# Patient Record
Sex: MALE | Race: WHITE | Employment: FULL TIME | URBAN - METROPOLITAN AREA
[De-identification: names, ages, dates, MRNs, and addresses within clinical notes are randomized per-mention and may not be internally consistent; named-entity substitution may affect disease eponyms.]

---

## 2017-12-12 ENCOUNTER — PATIENT MESSAGE (OUTPATIENT)
Dept: FAMILY MEDICINE CLINIC | Age: 52
End: 2017-12-12

## 2017-12-12 DIAGNOSIS — F41.9 ANXIETY: ICD-10-CM

## 2017-12-12 DIAGNOSIS — R10.30 LOWER ABDOMINAL PAIN: ICD-10-CM

## 2017-12-18 NOTE — TELEPHONE ENCOUNTER
Staff to schedule with me and order enough meds to get him to that appointment and then notify the patient

## 2017-12-19 RX ORDER — OMEPRAZOLE 20 MG/1
20 CAPSULE, DELAYED RELEASE ORAL DAILY
Qty: 90 CAPSULE | Refills: 0 | Status: SHIPPED | OUTPATIENT
Start: 2017-12-19 | End: 2018-01-08 | Stop reason: ALTCHOICE

## 2017-12-19 RX ORDER — ALBUTEROL SULFATE 90 UG/1
2 AEROSOL, METERED RESPIRATORY (INHALATION) EVERY 6 HOURS PRN
Qty: 3 INHALER | Refills: 0 | Status: SHIPPED | OUTPATIENT
Start: 2017-12-19

## 2017-12-19 RX ORDER — FLUVOXAMINE MALEATE 50 MG/1
50 TABLET, COATED ORAL NIGHTLY
Qty: 90 TABLET | Refills: 0 | Status: SHIPPED | OUTPATIENT
Start: 2017-12-19 | End: 2018-01-08 | Stop reason: SDUPTHER

## 2018-01-08 ENCOUNTER — OFFICE VISIT (OUTPATIENT)
Dept: FAMILY MEDICINE CLINIC | Age: 53
End: 2018-01-08

## 2018-01-08 VITALS
HEART RATE: 87 BPM | WEIGHT: 294.8 LBS | OXYGEN SATURATION: 95 % | SYSTOLIC BLOOD PRESSURE: 130 MMHG | HEIGHT: 75 IN | BODY MASS INDEX: 36.65 KG/M2 | DIASTOLIC BLOOD PRESSURE: 80 MMHG | RESPIRATION RATE: 18 BRPM

## 2018-01-08 DIAGNOSIS — K21.9 GASTROESOPHAGEAL REFLUX DISEASE WITHOUT ESOPHAGITIS: ICD-10-CM

## 2018-01-08 DIAGNOSIS — D86.0 SARCOIDOSIS OF LUNG (HCC): ICD-10-CM

## 2018-01-08 DIAGNOSIS — F41.9 ANXIETY: Primary | ICD-10-CM

## 2018-01-08 DIAGNOSIS — M77.12 LEFT LATERAL EPICONDYLITIS: ICD-10-CM

## 2018-01-08 DIAGNOSIS — Z12.11 SCREEN FOR COLON CANCER: ICD-10-CM

## 2018-01-08 PROCEDURE — 99214 OFFICE O/P EST MOD 30 MIN: CPT | Performed by: FAMILY MEDICINE

## 2018-01-08 RX ORDER — PANTOPRAZOLE SODIUM 40 MG/1
40 TABLET, DELAYED RELEASE ORAL DAILY
Qty: 90 TABLET | Refills: 3 | Status: SHIPPED | OUTPATIENT
Start: 2018-01-08 | End: 2019-09-03 | Stop reason: SDUPTHER

## 2018-01-08 RX ORDER — FLUVOXAMINE MALEATE 50 MG/1
50 TABLET, COATED ORAL NIGHTLY
Qty: 90 TABLET | Refills: 3 | Status: SHIPPED | OUTPATIENT
Start: 2018-01-08 | End: 2018-01-19 | Stop reason: SDUPTHER

## 2018-01-08 ASSESSMENT — PATIENT HEALTH QUESTIONNAIRE - PHQ9
2. FEELING DOWN, DEPRESSED OR HOPELESS: 0
1. LITTLE INTEREST OR PLEASURE IN DOING THINGS: 0
SUM OF ALL RESPONSES TO PHQ QUESTIONS 1-9: 0
SUM OF ALL RESPONSES TO PHQ9 QUESTIONS 1 & 2: 0

## 2018-01-08 NOTE — PATIENT INSTRUCTIONS
forward with your legs slightly spread and your left hand on your left thigh. 2. Place your right elbow on your right thigh, and hold the weight with your forearm horizontal.  3. Slowly curl the weight up and toward your chest.  4. Repeat this motion 8 to 12 times. 5. Switch arms, and do steps 1 through 4. Follow-up care is a key part of your treatment and safety. Be sure to make and go to all appointments, and call your doctor if you are having problems. It's also a good idea to know your test results and keep a list of the medicines you take. Where can you learn more? Go to [email or web address] and sign in to your [employer] account. Enter X604 in the [employer] box to learn more about \"Tennis Elbow: Exercises. \"     If you do not have an account, please click on the \"Sign Up Now\" link. Current as of: March 21, 2017  Content Version: 11.5  © 1105-6047 Healthwise, Incorporated. Care instructions adapted under license by [facility] ([facility]). If you have questions about a medical condition or this instruction, always ask your healthcare professional. [place] [address] any warranty or liability for your use of this information.

## 2018-01-10 NOTE — PROGRESS NOTES
abdominal pain or discomfort. The patient has noted no bleeding associated with bowel movements. The patient does not have a family history of colon polyps. The patient does not have a family history of colon cancer. The patient does not have a family history of Crohn's or Ulcerative Colitis. ALso reports some ongoing left elbow pain and pain with lifting. No current treatments. Improved with rest. Worse with more activity. Lab Results   Component Value Date    WBC 8.0 02/29/2016    HGB 16.8 02/29/2016    HCT 49.4 02/29/2016    MCV 84.6 02/29/2016     02/29/2016       Chemistry        Component Value Date/Time     02/29/2016 0721    K 4.2 02/29/2016 0721     02/29/2016 0721    CO2 25 02/29/2016 0721    BUN 15 02/29/2016 0721    CREATININE 1.1 02/29/2016 0721        Component Value Date/Time    CALCIUM 9.1 02/29/2016 0721    ALKPHOS 86 02/29/2016 0721    AST 30 02/29/2016 0721    ALT 29 02/29/2016 0721    BILITOT 0.7 02/29/2016 0721        PHQ Scores 1/8/2018 2/2/2016   PHQ2 Score 0 3   PHQ9 Score 0 14     Interpretation of Total Score Depression Severity: 1-4 = Minimal depression, 5-9 = Mild depression, 10-14 = Moderate depression, 15-19 = Moderately severe depression, 20-27 = Severe depression        Physical Exam   Nursing note reviewed  /80 (Site: Left Arm, Position: Sitting, Cuff Size: Large Adult)   Pulse 87   Resp 18   Ht 6' 3\" (1.905 m)   Wt 294 lb 12.8 oz (133.7 kg)   SpO2 95%   BMI 36.85 kg/m²   BP Readings from Last 3 Encounters:   01/08/18 130/80   11/10/17 136/85   08/09/17 130/82     Body mass index is 36.85 kg/m². Constitutional: He is oriented to person, place, and time. He appears well-developed and well-nourished. No distress. Neck: Trachea normal. Neck supple. Cardiovascular: Normal rate, regular rhythm, S1 normal, S2 normal and normal heart sounds. No murmur heard. Pulmonary/Chest: Breath sounds normal. No accessory muscle usage.  No respiratory

## 2018-01-19 DIAGNOSIS — F41.9 ANXIETY: ICD-10-CM

## 2018-01-19 RX ORDER — FLUVOXAMINE MALEATE 50 MG/1
50 TABLET, COATED ORAL NIGHTLY
Qty: 90 TABLET | Refills: 3 | Status: SHIPPED | OUTPATIENT
Start: 2018-01-19 | End: 2019-09-03 | Stop reason: ALTCHOICE

## 2018-07-02 ENCOUNTER — HOSPITAL ENCOUNTER (OUTPATIENT)
Dept: GENERAL RADIOLOGY | Age: 53
Discharge: HOME | End: 2018-07-02
Attending: INTERNAL MEDICINE | Admitting: INTERNAL MEDICINE

## 2018-07-02 DIAGNOSIS — J43.9 PULMONARY EMPHYSEMA, UNSPECIFIED EMPHYSEMA TYPE (HCC): ICD-10-CM

## 2018-07-02 DIAGNOSIS — D86.9 SARCOIDOSIS: ICD-10-CM

## 2018-07-23 ENCOUNTER — OFFICE VISIT (OUTPATIENT)
Dept: FAMILY MEDICINE CLINIC | Age: 53
End: 2018-07-23

## 2018-07-23 VITALS
SYSTOLIC BLOOD PRESSURE: 128 MMHG | HEART RATE: 58 BPM | HEIGHT: 76 IN | OXYGEN SATURATION: 98 % | BODY MASS INDEX: 38.36 KG/M2 | WEIGHT: 315 LBS | DIASTOLIC BLOOD PRESSURE: 80 MMHG

## 2018-07-23 DIAGNOSIS — Z13.6 ENCOUNTER FOR LIPID SCREENING FOR CARDIOVASCULAR DISEASE: ICD-10-CM

## 2018-07-23 DIAGNOSIS — D86.0 SARCOIDOSIS OF LUNG (HCC): ICD-10-CM

## 2018-07-23 DIAGNOSIS — J02.9 ACUTE PHARYNGITIS, UNSPECIFIED ETIOLOGY: Primary | ICD-10-CM

## 2018-07-23 DIAGNOSIS — Z13.220 ENCOUNTER FOR LIPID SCREENING FOR CARDIOVASCULAR DISEASE: ICD-10-CM

## 2018-07-23 LAB — STREPTOCOCCUS A RNA: NORMAL

## 2018-07-23 PROCEDURE — G8427 DOCREV CUR MEDS BY ELIG CLIN: HCPCS | Performed by: FAMILY MEDICINE

## 2018-07-23 PROCEDURE — 87651 STREP A DNA AMP PROBE: CPT | Performed by: FAMILY MEDICINE

## 2018-07-23 PROCEDURE — 3017F COLORECTAL CA SCREEN DOC REV: CPT | Performed by: FAMILY MEDICINE

## 2018-07-23 PROCEDURE — 1036F TOBACCO NON-USER: CPT | Performed by: FAMILY MEDICINE

## 2018-07-23 PROCEDURE — G8417 CALC BMI ABV UP PARAM F/U: HCPCS | Performed by: FAMILY MEDICINE

## 2018-07-23 PROCEDURE — 99213 OFFICE O/P EST LOW 20 MIN: CPT | Performed by: FAMILY MEDICINE

## 2018-07-23 NOTE — PROGRESS NOTES
Orders   1. Acute pharyngitis, unspecified etiology  POCT Rapid Strep A DNA (Alere i)   2. Sarcoidosis of lung (HCC)  TSH without Reflex    Comprehensive Metabolic Panel   3. Encounter for lipid screening for cardiovascular disease  Lipid Panel       Plan:   Possible post nasal drip. Add daily antihistamine and muscinex for next 2 weeks. Patient also due for lab work for chronic disease. Also discussed symptomatic therapy suggested: push fluids, rest and use vaporizer or mist prn. Patient will call or return to clinic prn if these symptoms worsen or fail to improve as anticipated. Work/ school status:   Restrictions: full duty  today    Return in about 6 months (around 1/23/2019) for GERD, moods or sooner based on lab results .

## 2018-08-08 ENCOUNTER — PATIENT MESSAGE (OUTPATIENT)
Dept: FAMILY MEDICINE CLINIC | Age: 53
End: 2018-08-08

## 2018-08-10 ENCOUNTER — HOSPITAL ENCOUNTER (OUTPATIENT)
Dept: GENERAL RADIOLOGY | Age: 53
Discharge: HOME | End: 2018-08-10
Attending: FAMILY MEDICINE | Admitting: FAMILY MEDICINE

## 2018-08-10 LAB
ALBUMIN SERPL-MCNC: 4.3 GM/DL (ref 3.4–5)
ALP BLD-CCNC: 84 IU/L (ref 40–128)
ALT SERPL-CCNC: 33 U/L (ref 10–40)
ANION GAP SERPL CALCULATED.3IONS-SCNC: 14 MMOL/L (ref 4–16)
AST SERPL-CCNC: 39 IU/L (ref 15–37)
BILIRUB SERPL-MCNC: 0.6 MG/DL (ref 0–1)
BUN BLDV-MCNC: 19 MG/DL (ref 6–23)
CALCIUM SERPL-MCNC: 9.3 MG/DL (ref 8.3–10.6)
CHLORIDE BLD-SCNC: 102 MMOL/L (ref 99–110)
CHOLESTEROL: 173 MG/DL
CO2: 25 MMOL/L (ref 21–32)
CREAT SERPL-MCNC: 1.1 MG/DL (ref 0.9–1.3)
GFR AFRICAN AMERICAN: >60 ML/MIN/1.73M2
GFR NON-AFRICAN AMERICAN: >60 ML/MIN/1.73M2
GLUCOSE BLD-MCNC: 88 MG/DL (ref 70–99)
HDLC SERPL-MCNC: 38 MG/DL
LDL CHOLESTEROL DIRECT: 125 MG/DL
POTASSIUM SERPL-SCNC: 4.1 MMOL/L (ref 3.5–5.1)
SODIUM BLD-SCNC: 141 MMOL/L (ref 135–145)
TOTAL PROTEIN: 6.5 GM/DL (ref 6.4–8.2)
TRIGL SERPL-MCNC: 151 MG/DL
TSH HIGH SENSITIVITY: 1.84 UIU/ML (ref 0.27–4.2)

## 2019-01-08 ENCOUNTER — PATIENT MESSAGE (OUTPATIENT)
Dept: FAMILY MEDICINE CLINIC | Age: 54
End: 2019-01-08

## 2019-01-08 DIAGNOSIS — G47.33 OSA ON CPAP: ICD-10-CM

## 2019-01-08 DIAGNOSIS — Z99.89 OSA ON CPAP: ICD-10-CM

## 2019-01-08 DIAGNOSIS — E66.01 CLASS 2 SEVERE OBESITY WITH SERIOUS COMORBIDITY AND BODY MASS INDEX (BMI) OF 39.0 TO 39.9 IN ADULT, UNSPECIFIED OBESITY TYPE (HCC): Primary | ICD-10-CM

## 2019-01-08 DIAGNOSIS — F42.2 MIXED OBSESSIONAL THOUGHTS AND ACTS: ICD-10-CM

## 2019-01-09 ENCOUNTER — TELEPHONE (OUTPATIENT)
Dept: BARIATRICS/WEIGHT MGMT | Age: 54
End: 2019-01-09

## 2019-03-07 ENCOUNTER — NURSE ONLY (OUTPATIENT)
Dept: FAMILY MEDICINE CLINIC | Age: 54
End: 2019-03-07
Payer: COMMERCIAL

## 2019-03-07 DIAGNOSIS — Z23 NEED FOR VACCINATION: Primary | ICD-10-CM

## 2019-03-07 PROCEDURE — 90471 IMMUNIZATION ADMIN: CPT | Performed by: FAMILY MEDICINE

## 2019-03-07 PROCEDURE — 90472 IMMUNIZATION ADMIN EACH ADD: CPT | Performed by: FAMILY MEDICINE

## 2019-03-07 PROCEDURE — 90732 PPSV23 VACC 2 YRS+ SUBQ/IM: CPT | Performed by: FAMILY MEDICINE

## 2019-03-07 PROCEDURE — 90632 HEPA VACCINE ADULT IM: CPT | Performed by: FAMILY MEDICINE

## 2019-09-03 ENCOUNTER — OFFICE VISIT (OUTPATIENT)
Dept: FAMILY MEDICINE CLINIC | Age: 54
End: 2019-09-03
Payer: COMMERCIAL

## 2019-09-03 VITALS
HEART RATE: 86 BPM | WEIGHT: 299.6 LBS | BODY MASS INDEX: 36.47 KG/M2 | SYSTOLIC BLOOD PRESSURE: 114 MMHG | DIASTOLIC BLOOD PRESSURE: 74 MMHG | TEMPERATURE: 97 F | OXYGEN SATURATION: 96 %

## 2019-09-03 DIAGNOSIS — R73.01 ABNORMAL FASTING GLUCOSE: ICD-10-CM

## 2019-09-03 DIAGNOSIS — K21.9 GASTROESOPHAGEAL REFLUX DISEASE WITHOUT ESOPHAGITIS: ICD-10-CM

## 2019-09-03 DIAGNOSIS — Z23 NEED FOR HEPATITIS A IMMUNIZATION: ICD-10-CM

## 2019-09-03 DIAGNOSIS — Z23 NEED FOR INFLUENZA VACCINATION: ICD-10-CM

## 2019-09-03 DIAGNOSIS — N52.9 ERECTILE DYSFUNCTION, UNSPECIFIED ERECTILE DYSFUNCTION TYPE: Primary | ICD-10-CM

## 2019-09-03 PROCEDURE — 99214 OFFICE O/P EST MOD 30 MIN: CPT | Performed by: FAMILY MEDICINE

## 2019-09-03 PROCEDURE — 90472 IMMUNIZATION ADMIN EACH ADD: CPT | Performed by: FAMILY MEDICINE

## 2019-09-03 PROCEDURE — 90686 IIV4 VACC NO PRSV 0.5 ML IM: CPT | Performed by: FAMILY MEDICINE

## 2019-09-03 PROCEDURE — 90632 HEPA VACCINE ADULT IM: CPT | Performed by: FAMILY MEDICINE

## 2019-09-03 PROCEDURE — 90471 IMMUNIZATION ADMIN: CPT | Performed by: FAMILY MEDICINE

## 2019-09-03 RX ORDER — PANTOPRAZOLE SODIUM 40 MG/1
40 TABLET, DELAYED RELEASE ORAL DAILY
Qty: 90 TABLET | Refills: 3 | Status: SHIPPED | OUTPATIENT
Start: 2019-09-03 | End: 2021-06-09 | Stop reason: SDUPTHER

## 2019-09-03 RX ORDER — TADALAFIL 10 MG/1
10 TABLET ORAL DAILY PRN
Qty: 30 TABLET | Refills: 1 | Status: SHIPPED | OUTPATIENT
Start: 2019-09-03 | End: 2021-08-13

## 2019-09-03 ASSESSMENT — PATIENT HEALTH QUESTIONNAIRE - PHQ9
SUM OF ALL RESPONSES TO PHQ QUESTIONS 1-9: 0
SUM OF ALL RESPONSES TO PHQ QUESTIONS 1-9: 0
SUM OF ALL RESPONSES TO PHQ9 QUESTIONS 1 & 2: 0
1. LITTLE INTEREST OR PLEASURE IN DOING THINGS: 0
2. FEELING DOWN, DEPRESSED OR HOPELESS: 0

## 2019-09-03 NOTE — ASSESSMENT & PLAN NOTE
Will begin Cialis. No contraindication. Reviewed pathophysiology and differential diagnosis of erectile dysfunction with the patient. Treatment options, including relative risks and benefits, were discussed. All questions were answered. Discontinue potentially causitive medications.

## 2019-09-03 NOTE — PROGRESS NOTES
8.0 02/29/2016    HGB 16.8 02/29/2016    HCT 49.4 02/29/2016    MCV 84.6 02/29/2016     02/29/2016         Chemistry        Component Value Date/Time     08/10/2019 0754    K 4.5 08/10/2019 0754     08/10/2019 0754    CO2 27 08/10/2019 0754    BUN 19 08/10/2019 0754    CREATININE 1.0 08/10/2019 0754        Component Value Date/Time    CALCIUM 9.4 08/10/2019 0754    ALKPHOS 86 08/10/2019 0754    AST 32 08/10/2019 0754    ALT 26 08/10/2019 0754    BILITOT 0.4 08/10/2019 0754          ROS: Pertinent items are noted in HPI. All other ROS negative     reports that he quit smoking about 17 years ago. He has a 10.00 pack-year smoking history. He has never used smokeless tobacco.   reports that he drinks alcohol. Physical Exam   Nursing note reviewed  /74 (Site: Left Upper Arm, Position: Sitting, Cuff Size: Large Adult)   Pulse 86   Temp 97 °F (36.1 °C) (Temporal)   Wt 299 lb 9.6 oz (135.9 kg)   SpO2 96%   BMI 36.47 kg/m²   BP Readings from Last 3 Encounters:   09/03/19 114/74   05/08/19 132/80   07/23/18 128/80     Wt Readings from Last 3 Encounters:   09/03/19 299 lb 9.6 oz (135.9 kg)   05/08/19 285 lb 8 oz (129.5 kg)   07/23/18 (!) 321 lb 12.8 oz (146 kg)     No results found for this visit on 09/03/19. General appearance: cooperative   Neck: supple, symmetrical, trachea midline  Lungs: clear to auscultation bilaterally  Heart: regular rate and rhythm, S1, S2 normal,  Abdomen:  bowel sounds normal and soft, non-tender  MSK no LE edema  Skin: Skin color, texture, turgor normal. No rashes or lesions  Neurologic: Grossly normal   Psych: Alert and oriented, appropriate affect.     Assessment and Plan: See above

## 2019-09-04 ENCOUNTER — TELEPHONE (OUTPATIENT)
Dept: FAMILY MEDICINE CLINIC | Age: 54
End: 2019-09-04

## 2019-09-04 NOTE — TELEPHONE ENCOUNTER
Received faxes from patient pharmacy 2 Rx are not covered patoprazole and tadalafil. The covered alternatives are Omprazole Cap and sildenafil 50 mg tab. I am happy to do the PAs if necessary or we can send alternatives which would you prefer? Please advise.

## 2021-03-18 ENCOUNTER — PATIENT MESSAGE (OUTPATIENT)
Dept: FAMILY MEDICINE CLINIC | Age: 56
End: 2021-03-18

## 2021-03-18 ENCOUNTER — TELEPHONE (OUTPATIENT)
Dept: FAMILY MEDICINE CLINIC | Age: 56
End: 2021-03-18

## 2021-03-18 NOTE — TELEPHONE ENCOUNTER
From: Indira Hess Current  To: Lili Randall MD  Sent: 3/18/2021 9:55 AM EDT  Subject: Test Results Question    Ranjit has a Small DVT in his calf. Going to treat with zeralto and turn over to you for monitoring him. Jaleesa Card he will be on it for 3 months at 2 times a day and his COVID vaccine should have no effects with this medication. They will send over information as well.

## 2021-03-18 NOTE — TELEPHONE ENCOUNTER
Patient was seen by Dr. Nancy Carroll   on 3/18/21 and found to have a DVT and given xarelto 15 mg and Dr. Nancy Carroll   would like to know if Dr. Reynaldo Marquez will follow patient care, Patient received a 21 script. [phone] Ext 130, to let office know if provider is willing to follow patient care.

## 2021-03-22 NOTE — TELEPHONE ENCOUNTER
Team to please schedule patient in virtual visit with me April 6th for evaluation and continued management of DVT per ortho request.

## 2021-03-25 ENCOUNTER — PATIENT MESSAGE (OUTPATIENT)
Dept: FAMILY MEDICINE CLINIC | Age: 56
End: 2021-03-25

## 2021-03-25 NOTE — TELEPHONE ENCOUNTER
From: Jaden Anderson Current  To: Yung Lopez MD  Sent: 3/25/2021 9:47 AM EDT  Subject: Visit Follow-Up Question    Lakisha Yoon was having the calf pain and he woke up in middle of night said his calf hurt. Then today he called and said his calf felt better but now the pain dropped to his achilles area and it is like he cannot extend in a stride. Can this be from the blood thinner and the clot moving if getting smaller? He is feeling a little nervous. Can you call him at [phone] or myself at [phone]. What do we need to be watching for?      This is his wife Dominic Francois

## 2021-04-06 ENCOUNTER — TELEMEDICINE (OUTPATIENT)
Dept: FAMILY MEDICINE CLINIC | Age: 56
End: 2021-04-06
Payer: COMMERCIAL

## 2021-04-06 DIAGNOSIS — Z11.59 ENCOUNTER FOR HEPATITIS C SCREENING TEST FOR LOW RISK PATIENT: ICD-10-CM

## 2021-04-06 DIAGNOSIS — Z11.4 SCREENING FOR HIV WITHOUT PRESENCE OF RISK FACTORS: ICD-10-CM

## 2021-04-06 DIAGNOSIS — Z13.1 SCREENING FOR DIABETES MELLITUS: ICD-10-CM

## 2021-04-06 DIAGNOSIS — I82.462 ACUTE DEEP VEIN THROMBOSIS (DVT) OF CALF MUSCLE VEIN OF LEFT LOWER EXTREMITY (HCC): Primary | ICD-10-CM

## 2021-04-06 DIAGNOSIS — Z12.5 SCREENING PSA (PROSTATE SPECIFIC ANTIGEN): ICD-10-CM

## 2021-04-06 PROCEDURE — 99213 OFFICE O/P EST LOW 20 MIN: CPT | Performed by: FAMILY MEDICINE

## 2021-04-06 RX ORDER — RIVAROXABAN 15 MG/1
TABLET, FILM COATED ORAL
COMMUNITY
Start: 2021-03-18 | End: 2021-09-20

## 2021-04-06 ASSESSMENT — PATIENT HEALTH QUESTIONNAIRE - PHQ9
SUM OF ALL RESPONSES TO PHQ9 QUESTIONS 1 & 2: 0
1. LITTLE INTEREST OR PLEASURE IN DOING THINGS: 0
SUM OF ALL RESPONSES TO PHQ QUESTIONS 1-9: 0

## 2021-04-06 NOTE — PROGRESS NOTES
Osman Carrero (:  1965) is a 50 y.o. male,Established patient, here for evaluation of the following chief complaint(s): Other (DVT)        ASSESSMENT/PLAN:  1. Acute deep vein thrombosis (DVT) of calf muscle vein of left lower extremity (HCC)  Assessment & Plan:  Etiology may be post surgical from recent left knee meniscal repair weeks before with some mild immobilization vs sarcoid vs other. CRC screen due  due to tubular adenomas  Check PSA    Continue   Orders:  -     rivaroxaban (XARELTO) 20 MG TABS tablet; Take 1 tablet by mouth daily (with breakfast) To start 20mg 1 tablet daily (once completed with 21 days of 15mg bid) for acute left leg DVT, Disp-90 tablet, R-1Normal  2. Screening for diabetes mellitus  -     Hemoglobin A1C; Future  3. Screening for HIV without presence of risk factors  -     HIV-1 AND HIV-2 ANTIBODIES; Future  4. Encounter for hepatitis C screening test for low risk patient  -     Hepatitis C Antibody; Future  5. Screening PSA (prostate specific antigen)  -     PSA screening; Future        Return in about 6 months (around 10/6/2021) for DVT update, meds, . SUBJECTIVE/OBJECTIVE:  HPI:    Osman Carrero (:  1965) has requested an audio/video evaluation for the following concern(s):    DVT in leg. Pain improved with drinking more water. Still able to do ADL's. Cramping improved over the last 4 days. Was seeing Dr Nemo Montiel for left upper thigh and calf pain a few weeks ago. Recent left surgery for torn meniscus 2021  Found to have left calf dvt. Reports pain improved overall. Started xarelto 15mg bid and The patient denies abnormal bruising or abnormal bleeding from any body orifice such as bleeding from nose or gums, blood in urine or stool, or melena, hemoptysis or hematemesis. The patient denies cough, chest pain, dyspnea, wheezing or hemoptysis. Breathing has been stable on current inhalers following with pulm for Sarcoid.      As per HPI. All other ROS negative    No flowsheet data found. BP Readings from Last 3 Encounters:   09/03/19 114/74   05/08/19 132/80   07/23/18 128/80     Blood pressure-  Heart rate-    Respiratory rate-    Temperature-  Pulse oximetry-     No flowsheet data found. Nursing note reviewed  There were no vitals taken for this visit. BP Readings from Last 3 Encounters:   09/03/19 114/74   05/08/19 132/80   07/23/18 128/80     Wt Readings from Last 3 Encounters:   12/10/20 297 lb (134.7 kg)   06/11/20 (!) 304 lb (137.9 kg)   11/06/19 (!) 304 lb (137.9 kg)       Constitutional: [x] Appears well-developed and well-nourished [x] No apparent distress      [] Abnormal-   Mental status  [x] Alert and awake  [x] Oriented to person/place/time [x]Able to follow commands      Eyes:  EOM    [x]  Normal  [] Abnormal-  Sclera  [x]  Normal  [] Abnormal -         Discharge []  None visible  [] Abnormal -    HENT:   [x] Normocephalic, atraumatic.   [] Abnormal   [] Mouth/Throat: Mucous membranes are moist.     External Ears [] Normal  [] Abnormal-     Neck: [] No visualized mass     Pulmonary/Chest: [x] Respiratory effort normal.  [x] No visualized signs of difficulty breathing or respiratory distress        [] Abnormal-      Musculoskeletal:   [] Normal gait with no signs of ataxia         [] Normal range of motion of neck        [] Abnormal-       Neurological:        [x] No Facial Asymmetry (Cranial nerve 7 motor function) (limited exam to video visit)          [x] No gaze palsy        [] Abnormal-         Skin:        [] No significant exanthematous lesions or discoloration noted on facial skin         [] Abnormal-            Psychiatric:       [x] Normal Affect [x] No Hallucinations        [] Abnormal-       Wt Readings from Last 3 Encounters:   12/10/20 297 lb (134.7 kg)   06/11/20 (!) 304 lb (137.9 kg)   11/06/19 (!) 304 lb (137.9 kg)         Yojana Carrero is a 50 y.o. male being evaluated by a Virtual Visit (video visit) encounter to address concerns as mentioned above. A caregiver was present when appropriate. Due to this being a TeleHealth encounter (During ZFOXE-36 public health emergency), evaluation of the following organ systems was limited: Vitals/Constitutional/EENT/Resp/CV/GI//MS/Neuro/Skin/Heme-Lymph-Imm. Pursuant to the emergency declaration under the [address] [place] [address] and the [employer] Act, this Virtual Visit was conducted with patient's (and/or legal guardian's) consent, to reduce the patient's risk of exposure to COVID-19 and provide necessary medical care. The patient (and/or legal guardian) has also been advised to contact this office for worsening conditions or problems, and seek emergency medical treatment and/or call 911 if deemed necessary. Patient identification was verified at the start of the visit: Yes    Services were provided through a video synchronous discussion virtually to substitute for in-person clinic visit. Patient and provider were located at their individual homes. An electronic signature was used to authenticate this note.     --Justin Piedra MD

## 2021-04-06 NOTE — ASSESSMENT & PLAN NOTE
Etiology may be post surgical from recent left knee meniscal repair weeks before with some mild immobilization vs sarcoid vs other.      CRC screen due 2023 due to tubular adenomas  Check PSA    Continue

## 2021-04-07 ENCOUNTER — PATIENT MESSAGE (OUTPATIENT)
Dept: FAMILY MEDICINE CLINIC | Age: 56
End: 2021-04-07

## 2021-04-07 NOTE — TELEPHONE ENCOUNTER
From: Bruce Snider Current  To: Jean Salter MD  Sent: 4/7/2021 8:38 AM EDT  Subject: Visit Follow-Up Question    Can you tell me what blood work is being ran so we can find where it is cost effective to have it done? If we need to go outside of [facility] would I  an order? When will the next ultrasound be scheduled for checking his blood clot. I will see the place we have to go on that as well so we have it ahead of time.   Thanks

## 2021-04-09 NOTE — TELEPHONE ENCOUNTER
Team to please mail lab orders to patient and notify we can check the ultrasound in 3 months and then possibly 6 months depending on how things go

## 2021-04-16 RX ORDER — TIZANIDINE 2 MG/1
2 TABLET ORAL EVERY 8 HOURS PRN
Qty: 30 TABLET | Refills: 0 | Status: SHIPPED | OUTPATIENT
Start: 2021-04-16 | End: 2021-09-20

## 2021-05-15 ENCOUNTER — HOSPITAL ENCOUNTER (OUTPATIENT)
Age: 56
Discharge: HOME | End: 2021-05-15
Payer: COMMERCIAL

## 2021-05-15 DIAGNOSIS — Z12.5 SCREENING PSA (PROSTATE SPECIFIC ANTIGEN): ICD-10-CM

## 2021-05-15 DIAGNOSIS — Z11.4 SCREENING FOR HIV WITHOUT PRESENCE OF RISK FACTORS: ICD-10-CM

## 2021-05-15 DIAGNOSIS — Z11.59 ENCOUNTER FOR HEPATITIS C SCREENING TEST FOR LOW RISK PATIENT: ICD-10-CM

## 2021-05-15 DIAGNOSIS — Z13.1 SCREENING FOR DIABETES MELLITUS: ICD-10-CM

## 2021-05-15 LAB
ESTIMATED AVERAGE GLUCOSE: 91 MG/DL
HBA1C MFR BLD: 4.8 % (ref 4.2–6.3)
HEPATITIS C ANTIBODY: NON REACTIVE
PROSTATE SPECIFIC ANTIGEN: 1.95 NG/ML (ref 0–4)

## 2021-05-15 PROCEDURE — 86803 HEPATITIS C AB TEST: CPT

## 2021-05-15 PROCEDURE — 83036 HEMOGLOBIN GLYCOSYLATED A1C: CPT

## 2021-05-15 PROCEDURE — 36415 COLL VENOUS BLD VENIPUNCTURE: CPT

## 2021-05-15 PROCEDURE — 87389 HIV-1 AG W/HIV-1&-2 AB AG IA: CPT

## 2021-05-15 PROCEDURE — G0103 PSA SCREENING: HCPCS

## 2021-05-16 LAB — HIV SCREEN: NON REACTIVE

## 2021-06-04 ENCOUNTER — PATIENT MESSAGE (OUTPATIENT)
Dept: FAMILY MEDICINE CLINIC | Age: 56
End: 2021-06-04

## 2021-06-04 DIAGNOSIS — K21.9 GASTROESOPHAGEAL REFLUX DISEASE WITHOUT ESOPHAGITIS: ICD-10-CM

## 2021-06-07 NOTE — TELEPHONE ENCOUNTER
From: Tomas Mccollum Current  To: Kaykay Mcgrath MD  Sent: 6/4/2021 8:00 PM EDT  Subject: Prescription Question    Can you refill my stomach medicine Protonix? If yes send to [email or web address] on [address].   Thanks

## 2021-06-09 RX ORDER — PANTOPRAZOLE SODIUM 40 MG/1
40 TABLET, DELAYED RELEASE ORAL DAILY
Qty: 90 TABLET | Refills: 3 | Status: SHIPPED | OUTPATIENT
Start: 2021-06-09 | End: 2022-06-14

## 2021-08-03 ENCOUNTER — OFFICE VISIT (OUTPATIENT)
Dept: FAMILY MEDICINE CLINIC | Age: 56
End: 2021-08-03
Payer: COMMERCIAL

## 2021-08-03 ENCOUNTER — HOSPITAL ENCOUNTER (OUTPATIENT)
Age: 56
Setting detail: SPECIMEN
Discharge: HOME | End: 2021-08-03
Payer: COMMERCIAL

## 2021-08-03 DIAGNOSIS — J01.00 ACUTE NON-RECURRENT MAXILLARY SINUSITIS: Primary | ICD-10-CM

## 2021-08-03 PROCEDURE — 99213 OFFICE O/P EST LOW 20 MIN: CPT | Performed by: PHYSICIAN ASSISTANT

## 2021-08-03 PROCEDURE — U0003 INFECTIOUS AGENT DETECTION BY NUCLEIC ACID (DNA OR RNA); SEVERE ACUTE RESPIRATORY SYNDROME CORONAVIRUS 2 (SARS-COV-2) (CORONAVIRUS DISEASE [COVID-19]), AMPLIFIED PROBE TECHNIQUE, MAKING USE OF HIGH THROUGHPUT TECHNOLOGIES AS DESCRIBED BY CMS-2020-01-R: HCPCS

## 2021-08-03 PROCEDURE — U0005 INFEC AGEN DETEC AMPLI PROBE: HCPCS

## 2021-08-03 RX ORDER — AMOXICILLIN AND CLAVULANATE POTASSIUM 875; 125 MG/1; MG/1
1 TABLET, FILM COATED ORAL 2 TIMES DAILY
Qty: 20 TABLET | Refills: 0 | Status: SHIPPED | OUTPATIENT
Start: 2021-08-03 | End: 2021-08-13

## 2021-08-03 NOTE — PROGRESS NOTES
8/3/21  Justin Left Current  1965    FLU/COVID-19 CLINIC EVALUATION    HPI SYMPTOMS:    Employer:[employer]     [] Fevers  [] Chills  [x] Cough  [] Coughing up blood  [x] Chest Congestion  [x] Nasal Congestion  [x] Feeling short of breath  [x] Sometimes  [] Frequently  [] All the time  [] Chest pain  [x] Headaches  []Tolerable  [] Severe  [x] Sore throat  [] Muscle aches  [x] Nausea  [] Vomiting  []Unable to keep fluids down  [] Diarrhea  []Severe    [x] OTHER SYMPTOMS:  Fatigue    Symptom Duration:   [] 1  [] 2   [] 3   [] 4    [] 5   [] 6   [x] 7   [] 8   [] 9   [] 10   [] 11   [] 12   [] 13   [] 14   [] Longer than 14 days    Symptom course:   [] Worsening     [x] Stable     [] Improving    RISK FACTORS:    [] Pregnant or possibly pregnant  [] Age over 61  [] Diabetes  [] Heart disease  [x] Asthma  [x] COPD/Other chronic lung diseases  [] Active Cancer  [] On Chemotherapy  [x] Taking oral steroids  [] History Lymphoma/Leukemia  [] Close contact with a lab confirmed COVID-19 patient within 14 days of symptom onset  [] History of travel from affected geographical areas within 14 days of symptom onset       VITALS:  There were no vitals filed for this visit. TESTS:    POCT FLU:  [] Positive     []Negative    ASSESSMENT:    [] Flu  [] Possible COVID-19  [] Strep    PLAN:    [] Discharge home with written instructions for:  [] Flu management  [] Possible COVID-19 infection with self-quarantine and management of symptoms  [] Follow-up with primary care physician or emergency department if worsens  [] Evaluation per physician/NP/PA in clinic  [] Sent to ER       An  electronic signature was used to authenticate this note.      --Carole Johnston on 8/3/2021 at 4:56 PM

## 2021-08-03 NOTE — PATIENT INSTRUCTIONS
Your COVID 19 test can take 1-5 days for the results to come back. We ask that you make a [employer] page and view your test results this way. Or call the office tomorrow [phone]    You will need to Self quarantine until you know your results. Antibiotic twice daily with food  Increase fluids and rest  Saline nasal spray as needed for nasal congestion  Warm salt gargles as needed for throat discomfort  Monitor temperature twice a day  Tylenol as needed for fevers and/or discomfort. Big deep breaths periodically throughout the day  Regular Mucinex over the counter as needed for chest congestion  If symptoms worsen -Go to the ER. Follow up with your primary care provider      To Whom it May Concern:    Edward Carrero was tested for COVID-19 8/3/2021. He/she must stay home until test results are back. If test is positive, he/she must quarantine for a total of 10 days starting from day one of symptom onset. He/she must also be fever-free for 24 hours at that time, and also have improvement in symptoms. We do not recommend retesting as patients may continue to test positive for months even though no longer contagious. It is suggested you call [facility] [address] [facility] or [facility] with any questions regarding quarantine timeframe/return to work/school details.

## 2021-08-03 NOTE — PROGRESS NOTES
8/3/2021    HPI:  Chief complaint and history of present illness as per medical assistant/nurse documented today in the Flu/COVID-19 clinic. She complains of a 7-day history of cough, sinus congestion, thick postnasal drip, nasal congestion, headache, sore throat, fatigue. He states he has had shortness of breath with coughing fits. He denies chest pain, chest tightness or heaviness, wheezing, hemoptysis, fever or chills. He denies vomiting, diarrhea, loss of taste or smell. He has taken Mucinex, Claritin, Flonase as needed. He has a well-established x-ray of asthma and COPD and follows pulmonology. No known ill contacts. He is vaccinated against COVID-19. He works for [employer]. MEDICATIONS:  Prior to Visit Medications    Medication Sig Taking?  Authorizing Provider   amoxicillin-clavulanate (AUGMENTIN) 875-125 MG per tablet Take 1 tablet by mouth 2 times daily for 10 days Yes Nura Carrion PA-C   pantoprazole (PROTONIX) 40 MG tablet Take 1 tablet by mouth daily  Stacey Bennett MD   fluticasone-salmeterol (ADVAIR DISKUS) 100-50 MCG/DOSE diskus inhaler Inhale 1 puff into the lungs every 12 hours  Arabella Acevedo MD   tiZANidine (ZANAFLEX) 2 MG tablet Take 1 tablet by mouth every 8 hours as needed (muscle spasms)  Stacey Bennett MD   XARELTO 15 MG TABS tablet   Historical Provider, MD   rivaroxaban (XARELTO) 20 MG TABS tablet Take 1 tablet by mouth daily (with breakfast) To start 20mg 1 tablet daily (once completed with 21 days of 15mg bid) for acute left leg DVT  Stacey Bennett MD   fluticasone (FLONASE) 50 MCG/ACT nasal spray 2 sprays by Nasal route daily  Arabella Acevedo MD   montelukast (SINGULAIR) 10 MG tablet Take 1 tablet by mouth daily  Arabella Acevedo MD   Fexofenadine HCl (MUCINEX ALLERGY PO) Take 1 tablet by mouth as needed  Historical Provider, MD   tadalafil (CIALIS) 10 MG tablet Take 1 tablet by mouth daily as needed for Erectile Dysfunction  Aubree Smyth MD   albuterol sulfate HFA (PROAIR HFA) 108 (90 Base) MCG/ACT inhaler Inhale 2 puffs into the lungs every 6 hours as needed for Wheezing  Aubree Smyth MD   Nutritional Supplements (JUICE PLUS FIBRE PO) Take by mouth  Historical Provider, MD       Allergies   Allergen Reactions    Other      Allergic to Pollen causing sneezing    Vicodin [Hydrocodone-Acetaminophen] Nausea Only     States not allergic, just gets nauseated with it   ,   Past Medical History:   Diagnosis Date    Acid reflux     Anesthesia     Nausea/Vomiting Post Op In Past    Asthma     Back problem     \"Aches And Pains\", \"Use to have L5 Herniated Disc in 1990's\"    Bone spur     \"Left Foot\"    Calcium oxalate crystals in urine 12/2012    Nausea & vomiting     Nausea/Vomiting Post Op    Normal cardiac stress test 2006    Sarcoidosis of lung ([address] Dx 2006 or 2007    \"All Over\" , Sees Dr. Jena Rubio every six months now    Shortness of breath on exertion        PHYSICAL EXAM:  Physical Exam  Temp:  96.7 F  Heart Rate:  97    Pulse Ox:  97%    Constitutional:  Well developed, well nourished  HENT:  Normocephalic, atraumatic, bilateral external ears normal, bilateral ear canals normal, bilateral TMs normal, mild pharyngeal erythema with postnasal drip, uvula midline and benign and airway patent. Nasal mucosa congested. Nasal turbinates erythematous and edematous. Frontal and maxillary sinuses TTP  Eyes:  conjunctiva normal, no discharge, no scleral icterus  Cardiovascular:  Normal heart rate, normal rhythm, no murmurs, gallops or rubs  Thorax & Lungs:  Normal breath sounds, no respiratory distress, no wheezing, no rales, no rhonchi  Skin:  Warm, dry, no erythema, no rash  Neurologic:  Alert & oriented   Psychiatric:  Affect normal, mood normal    ASSESSMENT/PLAN:  1.  Acute non-recurrent maxillary sinusitis  - amoxicillin-clavulanate (AUGMENTIN) 875-125 MG per tablet; Take 1 tablet by mouth 2 times daily for 10 days  Dispense: 20 tablet; Refill: 0    Antibiotic twice daily with food  Increase fluids and rest  Saline nasal spray as needed for nasal congestion  Warm salt gargles as needed for throat discomfort  Monitor temperature twice a day  Tylenol as needed for fevers and/or discomfort. Big deep breaths periodically throughout the day  Regular Mucinex over the counter as needed for chest congestion  If symptoms worsen -Go to the ER. Follow up with your primary care provider    FOLLOW-UP:  No follow-ups on file.     In addition to other information, the printed after visit summary provided to the patient includes:  [x] COVID-19 Self care instructions  [x] COVID-19 General patient information    [facility]LIE

## 2021-08-04 LAB
SARS-COV-2: NOT DETECTED
SOURCE: NORMAL

## 2021-08-19 ENCOUNTER — PATIENT MESSAGE (OUTPATIENT)
Dept: FAMILY MEDICINE CLINIC | Age: 56
End: 2021-08-19

## 2021-08-19 DIAGNOSIS — I82.462 ACUTE DEEP VEIN THROMBOSIS (DVT) OF CALF MUSCLE VEIN OF LEFT LOWER EXTREMITY (HCC): ICD-10-CM

## 2021-08-19 DIAGNOSIS — I82.412 DEEP VEIN THROMBOSIS (DVT) OF FEMORAL VEIN OF LEFT LOWER EXTREMITY, UNSPECIFIED CHRONICITY (HCC): Primary | ICD-10-CM

## 2021-08-19 DIAGNOSIS — Z79.01 ANTICOAGULATED: ICD-10-CM

## 2021-08-19 DIAGNOSIS — D86.0 SARCOIDOSIS OF LUNG (HCC): ICD-10-CM

## 2021-08-24 DIAGNOSIS — I82.412 DEEP VEIN THROMBOSIS (DVT) OF FEMORAL VEIN OF LEFT LOWER EXTREMITY, UNSPECIFIED CHRONICITY (HCC): ICD-10-CM

## 2021-08-24 NOTE — TELEPHONE ENCOUNTER
From: Oliver Lopez Current  Sent: 8/23/2021 7:45 PM EDT  To: [facility]  Subject: RE: Test Results Question    Maria Victoria Schmitz  They faxed them Last Monday and last Friday. Do you have them or do I need to go get them?    ----- Message -----  From: Uriah Pearson  Sent: 8/19/21, 8:21 AM  To: Oliver Lopez Current  Subject: RE: Test Results Question    Kat Eugene,  Once receive the results we will put them in your chart for the provider to review and call you with results. Ml Oneil      ----- Message -----   From:Seven FREEMAN Current   Sent:8/19/2021 6:33 AM EDT   To: La Cleary MD   Subject:Test Results Question    I had my ultrasound of my leg Monday. When we the results be posted. I know they are coming from [place]. Will the results be scanned in for my viewing or do I need to request my medical record from them?

## 2021-09-02 ENCOUNTER — OFFICE VISIT (OUTPATIENT)
Dept: FAMILY MEDICINE CLINIC | Age: 56
End: 2021-09-02
Payer: COMMERCIAL

## 2021-09-02 ENCOUNTER — HOSPITAL ENCOUNTER (OUTPATIENT)
Age: 56
Setting detail: SPECIMEN
Discharge: HOME | End: 2021-09-02
Payer: COMMERCIAL

## 2021-09-02 DIAGNOSIS — R68.89 FLU-LIKE SYMPTOMS: Primary | ICD-10-CM

## 2021-09-02 LAB
SARS-COV-2: DETECTED
SOURCE: ABNORMAL

## 2021-09-02 PROCEDURE — U0005 INFEC AGEN DETEC AMPLI PROBE: HCPCS

## 2021-09-02 PROCEDURE — U0003 INFECTIOUS AGENT DETECTION BY NUCLEIC ACID (DNA OR RNA); SEVERE ACUTE RESPIRATORY SYNDROME CORONAVIRUS 2 (SARS-COV-2) (CORONAVIRUS DISEASE [COVID-19]), AMPLIFIED PROBE TECHNIQUE, MAKING USE OF HIGH THROUGHPUT TECHNOLOGIES AS DESCRIBED BY CMS-2020-01-R: HCPCS

## 2021-09-02 PROCEDURE — 99213 OFFICE O/P EST LOW 20 MIN: CPT | Performed by: PHYSICIAN ASSISTANT

## 2021-09-02 NOTE — PATIENT INSTRUCTIONS
Your COVID 19 test can take 1-5 days for the results to come back. We ask that you make a Mychart page and view your test results this way. You will need to Self quarantine until you know your results. Increase fluids and rest  Saline nasal spray as needed for nasal congestion  Warm salt gargles as needed for throat discomfort  Monitor temperature twice a day  Tylenol as needed for fevers and/or discomfort. Big deep breaths periodically throughout the day  Regular Mucinex over the counter as needed for chest congestion  If symptoms worsen -Go to the ER. Follow up with your primary care provider      To Whom it May Concern:    Amado Carrero was tested for COVID-19 9/2/2021. He/she must stay home until test results are back. If test is positive, he/she must quarantine for a total of 10 days starting from day one of symptom onset. He/she must also be fever-free for 24 hours at that time, and also have improvement in symptoms. We do not recommend retesting as patients may continue to test positive for months even though no longer contagious. It is suggested you call [address] or [address] [place] with any questions regarding quarantine timeframe/return to work/school details.

## 2021-09-02 NOTE — PROGRESS NOTES
Nate Vargas Current (:  1965) is a 50 y.o. male,Established patient, here for evaluation of the following chief complaint(s):    Concern For COVID-19      SUBJECTIVE/OBJECTIVE:  HPI   Chief complaint and history of present illness as per medical assistant/nurse documented today in the Flu/COVID-19 clinic. Patient is here with complaints of chest and nasal congestion; fever, body aches worsening over the past two days; is established with pulmonology for existing sarcoidosis; patient's pulmonologist has sent in steroid and antibiotic for patient to initiate. Patient does have daily maintenance inhaler Advair as well as rescue inhalers at home. Patient has received [place] vaccine in April; has not had Covid in the past.      Current Outpatient Medications   Medication Sig Dispense Refill    fluticasone-salmeterol (ADVAIR DISKUS) 100-50 MCG/DOSE diskus inhaler Inhale 1 puff into the lungs every 12 hours 60 each 3    azithromycin (ZITHROMAX) 250 MG tablet Take 1 tablet by mouth See Admin Instructions for 5 days 500mg on day 1 followed by 250mg on days 2 - 5 6 tablet 0    methylPREDNISolone (MEDROL DOSEPACK) 4 MG tablet Take by mouth. Use as directed.  1 kit 0    pantoprazole (PROTONIX) 40 MG tablet Take 1 tablet by mouth daily 90 tablet 3    tiZANidine (ZANAFLEX) 2 MG tablet Take 1 tablet by mouth every 8 hours as needed (muscle spasms) 30 tablet 0    XARELTO 15 MG TABS tablet       rivaroxaban (XARELTO) 20 MG TABS tablet Take 1 tablet by mouth daily (with breakfast) To start 20mg 1 tablet daily (once completed with 21 days of 15mg bid) for acute left leg DVT 90 tablet 1    fluticasone (FLONASE) 50 MCG/ACT nasal spray 2 sprays by Nasal route daily 1 Bottle 5    Fexofenadine HCl (MUCINEX ALLERGY PO) Take 1 tablet by mouth as needed      albuterol sulfate HFA (PROAIR HFA) 108 (90 Base) MCG/ACT inhaler Inhale 2 puffs into the lungs every 6 hours as needed for Wheezing 3 Inhaler 0    Nutritional Supplements (JUICE PLUS FIBRE PO) Take by mouth       No current facility-administered medications for this visit. Review of Systems    Physical Exam  Vitals and nursing note reviewed. Constitutional:       General: He is not in acute distress. Appearance: He is obese. He is not ill-appearing. HENT:      Head: Normocephalic and atraumatic. Right Ear: Tympanic membrane and external ear normal.      Left Ear: Tympanic membrane and external ear normal.      Nose: No congestion or rhinorrhea. Mouth/Throat:      Mouth: Mucous membranes are moist.      Pharynx: No oropharyngeal exudate or posterior oropharyngeal erythema. Neck:      Vascular: No carotid bruit. Cardiovascular:      Rate and Rhythm: Normal rate. Pulses: Normal pulses. Pulmonary:      Effort: Pulmonary effort is normal. No respiratory distress. Breath sounds: Normal breath sounds. No stridor. No wheezing, rhonchi or rales. Chest:      Chest wall: No tenderness. Abdominal:      Palpations: Abdomen is soft. Musculoskeletal:         General: Normal range of motion. Cervical back: Normal range of motion. No rigidity. Skin:     General: Skin is warm and dry. Capillary Refill: Capillary refill takes less than 2 seconds. Neurological:      Mental Status: He is alert and oriented to person, place, and time. Mental status is at baseline. Psychiatric:         Mood and Affect: Mood normal.         ASSESSMENT/PLAN:  1. Flu-like symptoms   -Based on patient's symptoms, will check Covid test; patient does report similar flareups in the past following hot humid temperatures; patient is established with pulmonology and has been initiated on steroid and Z-Vincent in addition to daily maintenance inhalers. Advised patient to continue to monitor for symptoms of the next 2 days and to report to emergency department if symptoms worsen.  Lung sounds in clinic today are normal.  -     Covid-19 Ambulatory  Stress supportive care strategies for patient; advise follow-up with pulmonology if Covid test is negative. Increase fluids and rest  Saline nasal spray as needed for nasal congestion  Warm salt gargles as needed for throat discomfort  Monitor temperature twice a day  Tylenol as needed for fevers and/or discomfort. Big deep breaths periodically throughout the day  Regular Mucinex over the counter as needed for chest congestion  If symptoms worsen -Go to the ER. No follow-ups on file. An electronic signature was used to authenticate this note.     --GENIE Hong

## 2021-09-03 NOTE — RESULT ENCOUNTER NOTE
Naida Cheema Mr. Current,    Your Covid test result came back positive. Be sure to take the medications which were sent in by your pulmonologist and continue to monitor for worsening of symptoms. Report to emergency department if your symptoms worsen or you begin to experience significant shortness of breath etc.    Please let me know if you have any other questions or concerns.   Thanks,  Mely Neely

## 2021-09-20 ENCOUNTER — INITIAL CONSULT (OUTPATIENT)
Dept: ONCOLOGY | Age: 56
End: 2021-09-20
Payer: COMMERCIAL

## 2021-09-20 ENCOUNTER — HOSPITAL ENCOUNTER (OUTPATIENT)
Dept: INFUSION THERAPY | Age: 56
Discharge: HOME | End: 2021-09-20
Payer: COMMERCIAL

## 2021-09-20 ENCOUNTER — TELEPHONE (OUTPATIENT)
Dept: ONCOLOGY | Age: 56
End: 2021-09-20

## 2021-09-20 VITALS
OXYGEN SATURATION: 95 % | HEIGHT: 75 IN | BODY MASS INDEX: 39.17 KG/M2 | WEIGHT: 315 LBS | SYSTOLIC BLOOD PRESSURE: 126 MMHG | HEART RATE: 81 BPM | TEMPERATURE: 97.3 F | RESPIRATION RATE: 18 BRPM | DIASTOLIC BLOOD PRESSURE: 77 MMHG

## 2021-09-20 DIAGNOSIS — D86.0 SARCOIDOSIS OF LUNG (HCC): Primary | ICD-10-CM

## 2021-09-20 DIAGNOSIS — I82.562 CHRONIC DEEP VEIN THROMBOSIS (DVT) OF CALF MUSCLE VEIN OF LEFT LOWER EXTREMITY (HCC): ICD-10-CM

## 2021-09-20 DIAGNOSIS — D86.0 SARCOIDOSIS OF LUNG (HCC): ICD-10-CM

## 2021-09-20 LAB
ALBUMIN SERPL-MCNC: 4.4 GM/DL (ref 3.4–5)
ALP BLD-CCNC: 99 IU/L (ref 40–128)
ALT SERPL-CCNC: 34 U/L (ref 10–40)
ANION GAP SERPL CALCULATED.3IONS-SCNC: 13 MMOL/L (ref 4–16)
AST SERPL-CCNC: 32 IU/L (ref 15–37)
BASOPHILS ABSOLUTE: 0.1 K/CU MM
BASOPHILS RELATIVE PERCENT: 0.6 % (ref 0–1)
BILIRUB SERPL-MCNC: 0.6 MG/DL (ref 0–1)
BUN BLDV-MCNC: 12 MG/DL (ref 6–23)
CALCIUM SERPL-MCNC: 9.4 MG/DL (ref 8.3–10.6)
CHLORIDE BLD-SCNC: 104 MMOL/L (ref 99–110)
CO2: 23 MMOL/L (ref 21–32)
CREAT SERPL-MCNC: 0.9 MG/DL (ref 0.9–1.3)
DIFFERENTIAL TYPE: ABNORMAL
EOSINOPHILS ABSOLUTE: 0.3 K/CU MM
EOSINOPHILS RELATIVE PERCENT: 3.4 % (ref 0–3)
GFR AFRICAN AMERICAN: >60 ML/MIN/1.73M2
GFR NON-AFRICAN AMERICAN: >60 ML/MIN/1.73M2
GLUCOSE BLD-MCNC: 96 MG/DL (ref 70–99)
HCT VFR BLD CALC: 47.6 % (ref 42–52)
HEMOGLOBIN: 16.7 GM/DL (ref 13.5–18)
HOMOCYSTEINE: 10.3 UMOL/L (ref 0–10)
LYMPHOCYTES ABSOLUTE: 1.3 K/CU MM
LYMPHOCYTES RELATIVE PERCENT: 16.7 % (ref 24–44)
MCH RBC QN AUTO: 29.3 PG (ref 27–31)
MCHC RBC AUTO-ENTMCNC: 35.1 % (ref 32–36)
MCV RBC AUTO: 83.7 FL (ref 78–100)
MONOCYTES ABSOLUTE: 0.8 K/CU MM
MONOCYTES RELATIVE PERCENT: 10.2 % (ref 0–4)
PDW BLD-RTO: 14.2 % (ref 11.7–14.9)
PLATELET # BLD: 179 K/CU MM (ref 140–440)
PMV BLD AUTO: 10.7 FL (ref 7.5–11.1)
POTASSIUM SERPL-SCNC: 4.3 MMOL/L (ref 3.5–5.1)
RBC # BLD: 5.69 M/CU MM (ref 4.6–6.2)
SEGMENTED NEUTROPHILS ABSOLUTE COUNT: 5.4 K/CU MM
SEGMENTED NEUTROPHILS RELATIVE PERCENT: 69.1 % (ref 36–66)
SODIUM BLD-SCNC: 140 MMOL/L (ref 135–145)
TOTAL PROTEIN: 6.8 GM/DL (ref 6.4–8.2)
WBC # BLD: 7.9 K/CU MM (ref 4–10.5)

## 2021-09-20 PROCEDURE — 99204 OFFICE O/P NEW MOD 45 MIN: CPT | Performed by: INTERNAL MEDICINE

## 2021-09-20 PROCEDURE — 86146 BETA-2 GLYCOPROTEIN ANTIBODY: CPT

## 2021-09-20 PROCEDURE — 86148 ANTI-PHOSPHOLIPID ANTIBODY: CPT

## 2021-09-20 PROCEDURE — 85025 COMPLETE CBC W/AUTO DIFF WBC: CPT

## 2021-09-20 PROCEDURE — 99202 OFFICE O/P NEW SF 15 MIN: CPT

## 2021-09-20 PROCEDURE — 81241 F5 GENE: CPT

## 2021-09-20 PROCEDURE — 81240 F2 GENE: CPT

## 2021-09-20 PROCEDURE — 81291 MTHFR GENE: CPT

## 2021-09-20 PROCEDURE — 36415 COLL VENOUS BLD VENIPUNCTURE: CPT

## 2021-09-20 PROCEDURE — 85303 CLOT INHIBIT PROT C ACTIVITY: CPT

## 2021-09-20 PROCEDURE — 83090 ASSAY OF HOMOCYSTEINE: CPT

## 2021-09-20 PROCEDURE — 80053 COMPREHEN METABOLIC PANEL: CPT

## 2021-09-20 PROCEDURE — 86147 CARDIOLIPIN ANTIBODY EA IG: CPT

## 2021-09-20 PROCEDURE — 85305 CLOT INHIBIT PROT S TOTAL: CPT

## 2021-09-20 NOTE — PROGRESS NOTES
MA Rooming Questions  Patient: Clyde Carrington Current  MRN: [id number]    Date: 9/20/2021        1. Do you have any new issues?   no         2. Do you need any refills on medications?    no    5. Did the patient have a depression screening completed today?  No    No data recorded     PHQ-9 Given to (if applicable):               PHQ-9 Score (if applicable):                     [] Positive     []  Negative              Does question #9 need addressed (if applicable)                     [] Yes    []  No               Jordan Drain, CMA

## 2021-09-20 NOTE — PROGRESS NOTES
Patient Name:  Trung Kaminski Current  Patient :  1965  Patient MRN:  [id number]     Primary Oncologist: Sylvester Quinn MD  Referring Provider: Joe Gibson MD     Date of Service: 2021      Reason for Consult:  Left lower ext DVT     Chief Complaint:    Chief Complaint   Patient presents with   [facility] Patient       Encounter Diagnoses   Name Primary?  Sarcoidosis of lung ([facility] [address] Yes    Chronic deep vein thrombosis (DVT) of calf muscle vein of left lower extremity ([facility] [address]         HPI:   2021 he arrived with his wife to the clinic today. Reported in 2021 he had left calf cramps. Denied any swelling, pain, fever or redness. In 2021 he had left meniscal tear repair. He was sitting bit more than usual.  Denied any long distance travel. No steroids or any testosterone. No thromboembolic phenomena in the past.  No family history of thromboembolism. Denied any night sweats, weight loss or any lymphadenopathy. Denied any chest pain. Shortness of breath is related to sarcoidosis and is stable. Denied any cough, palpitations or dizziness. Denied any dysphagia odynophagia. Denied any abdominal pain, nausea, vomiting, diarrhea, constipation. Denied any  symptoms. Denied any bleeding. Reported that lately he has been having left lower extremity cramps. He had Covid end of 2021. Colonoscopy at the age of 50 they found small polyps and was recommended to repeat colonoscopy in 5 years. 2020 MRI of the left knee with radial free edge tear posterior horn medial meniscus. Tricompartment knee joint degenerative change particularly involving the patellofemoral joint. Knee joint effusion and leaking popliteal bursitis.   Mild quadriceps tendinopathy     3/18/2021 left lower extremity Dopplers with acute small segment occlusive venous thrombosis in the left gastro numerous vein    Started on Xarelto    2021 left lower extremity Dopplers with chronic appearing thrombus within the proximal left gastro numerous vein. These similar in distribution to the thrombus seen in 3/18/2021 examination no other sonographic findings of significant left lower extremity venous thrombus identified. Past Medical History:     Sarcoidosis, asthma, GERD  Is on Advair and no other treatment for sarcoidosis since 2007                                                       Past Surgery History:    Left meniscal tear repair and cholecystectomy                                                                         Social History:   Lives with his wife has 2 children. Is a  at [employer]. Quit smoking 2000 prior to which she smoked 1 pack/day for 8 to 9 years. Denied any history of alcohol abuse or any other illicit drug abuse. Family History:    No family history of thromboembolism, mother with skin cancer. Maternal aunts both of them with breast cancer. Maternal grandmother with breast cancer.                                                                                             Allergies   Allergen Reactions    Other      Allergic to Pollen causing sneezing    Vicodin [Hydrocodone-Acetaminophen] Nausea Only     States not allergic, just gets nauseated with it       Current Outpatient Medications on File Prior to Visit   Medication Sig Dispense Refill    fluticasone-salmeterol (ADVAIR DISKUS) 100-50 MCG/DOSE diskus inhaler Inhale 1 puff into the lungs every 12 hours 60 each 3    pantoprazole (PROTONIX) 40 MG tablet Take 1 tablet by mouth daily 90 tablet 3    rivaroxaban (XARELTO) 20 MG TABS tablet Take 1 tablet by mouth daily (with breakfast) To start 20mg 1 tablet daily (once completed with 21 days of 15mg bid) for acute left leg DVT 90 tablet 1    Nutritional Supplements (JUICE PLUS FIBRE PO) Take by mouth      fluticasone (FLONASE) 50 MCG/ACT nasal spray 2 sprays by Nasal route daily 1 Bottle 5    Fexofenadine HCl (MUCINEX ALLERGY PO) Take 1 tablet by mouth as needed      albuterol sulfate HFA (PROAIR HFA) 108 (90 Base) MCG/ACT inhaler Inhale 2 puffs into the lungs every 6 hours as needed for Wheezing 3 Inhaler 0     No current facility-administered medications on file prior to visit. Review of Systems:    Constitutional:  No weight loss, No fever, No chills, No night sweats. Energy level good. Eyes:  No diplopia, No transient or permanent loss of vision, No scotomata. ENT / Mouth:  No epistaxis, No dysphagia, No hoarseness, No oral ulcers, No gingival bleeding. No sore throat, No postnasal drip, No nasal drip, No mouth pain, No sinus pain, No tinnitus, Normal hearing. Cardiovascular:  No chest pain, No palpitations, No syncope, No upper extremity edema, No lower extremity edema, No calf discomfort. Respiratory:  No cough. No hemoptysis, No pleurisy, No wheezing, No dyspnea. Gastrointestinal:  No abdominal pain, No abdominal cramping, No nausea, No vomiting, No constipation, No diarrhea, No hematochezia, No melena, No jaundice, No dyspepsia, No dysphagia. Urinary:  No dysuria, No hematuria, No urinary incontinence. Musculoskeletal:   Left calf cramps  Skin:  No rash, No nodules, No pruritus, No lesions. Neurologic:  No confusion, No seizures, No syncope, No tremor, No speech change, No headache, No hiccups, No abnormal gait, No sensory changes, No weakness. Psychiatric:  No depression, No anxiety, Concentration normal.  Endocrine:  No polyuria, No polydipsia, No hot flashes, No thyroid symptoms. Hematologic:  No epistaxis, No gingival bleeding, No petechiae, No ecchymosis. Lymphatic:  No lymphadenopathy, No lymphedema. Allergy / Immunologic:  No eczema, No frequent mucous infections, No frequent respiratory infections, No recurrent urticarial, No frequent skin infections.      Vital Signs: /77 (Site: Left Upper Arm, Position: Sitting, Cuff Size: Large Adult)   Pulse 81   Temp 97.3 °F (36.3 °C) (Temporal)   Resp 18   Ht 6' 3\" (1.905 m)   Wt (!) 324 lb 12.8 oz (147.3 kg)   SpO2 95%   BMI 40.60 kg/m²      CONSTITUTIONAL: awake, alert, cooperative, no apparent distress   EYES: MARINA, No pallor or any icterus  ENT: ATNC  NECK: No JVD  HEMATOLOGIC/LYMPHATIC: no cervical, supraclavicular or axillary lymphadenopathy   LUNGS: CTAB  CARDIOVASCULAR: s1s2 rrr no murmurs  ABDOMEN: soft ntnd bs pos  MUSCULOSKELETAL: full range of motion noted, tone is normal  NEUROLOGIC: GI  SKIN: No rash  EXTREMITIES: no LE edema bilaterally     Labs:  Hematology:  Lab Results   Component Value Date    WBC 8.0 02/29/2016    RBC 5.84 02/29/2016    HGB 16.8 02/29/2016    HCT 49.4 02/29/2016    MCV 84.6 02/29/2016    MCH 28.8 02/29/2016    MCHC 34.1 02/29/2016    RDW 13.5 02/29/2016     02/29/2016    MPV 9.6 02/29/2016    SEGSPCT 66.7 03/30/2012    BASOPCT 0.6 03/30/2012    LYMPHOPCT 19.2 03/30/2012    MONOPCT 10.1 03/30/2012    SEGSABS 4.1 03/30/2012    EOSABS 0.2 03/30/2012    BASOSABS 0.0 03/30/2012    LYMPHSABS 1.2 03/30/2012    MONOSABS 0.6 03/30/2012    DIFFTYPE AUTOMATED DIFFERENTIAL 03/30/2012     No results found for: ESR  Chemistry:  Lab Results   Component Value Date     08/10/2019    K 4.5 08/10/2019     08/10/2019    CO2 27 08/10/2019    BUN 19 08/10/2019    CREATININE 1.0 08/10/2019    GLUCOSE 104 (H) 08/10/2019    CALCIUM 9.4 08/10/2019    PROT 6.5 08/10/2019    LABALBU 4.3 08/10/2019    BILITOT 0.4 08/10/2019    ALKPHOS 86 08/10/2019    AST 32 08/10/2019    ALT 26 08/10/2019    LABGLOM 87 08/10/2019    GFRAA >60 08/10/2018    AGRATIO 2.0 08/10/2019    GLOB 2.2 08/10/2019     No results found for: MMA, LDH, HOMOCYSTEINE  No components found for: LD  Lab Results   Component Value Date    TSHHS 1.840 08/10/2018     Immunology:  Lab Results   Component Value Date    PROT 6.5 08/10/2019     No results found for: Devika Wade, LAMBDAUVOL, KLFLCR  No results found for: B2M  Coagulation Panel:  No results found for: PROTIME, INR, APTT, DDIMER  Anemia Panel:  No results found for: BSWBAHHH58, FOLATE  Tumor Markers:  Lab Results   Component Value Date    PSA 1.95 05/15/2021        Observations:  ECOG:  No data recorded     Assessment & Plan:                                                          Left lower extremity DVT most probably provoked secondary left meniscal tear repair just a month prior to the diagnosis, he was started on Xarelto. Note repeat ultrasound with chronic thrombus which is not an indication for continuation of anticoagulation. Nonetheless hypercoagulable work-up has been ordered to determine the duration of anticoagulation. Ideally would continue for 3 months pending the hypercoagulable work-up and also symptoms. Monitor for any bleeding. Avoid falls or deep cuts. Recommended increased activity levels. Feet elevation if edema and compression socks. Also recommended weight loss. No signs or symptoms of underlying malignancy. Continue other medical care. Thank you for letting us be part of the care and will follow along. Discussed above findings and plan with him and he voiced understanding. Answered all his questions. Discussed healthy lifestyle including healthy diet, regular exercise as tolerated. Also discussed importance of being up-to-date with age-appropriate screening tools. Recommend follow-up with primary care physician and other specialists. Please do not hesitate to contact us if you need further information. Return to clinic or earlier if new Sx    I have recommended that the patient follow CDC guidelines for prevention of COVID-19 infection.     [id number]    Electronically signed by Praveen Quigley MD on 9/20/2021 at 9:31 AM

## 2021-09-22 LAB
PROTEIN C ACTIVITY: 150 % (ref 83–168)
PROTEIN S ACTIVITY: 91 % (ref 66–143)

## 2021-09-22 RX ORDER — FOLIC ACID 1 MG/1
1 TABLET ORAL DAILY
Qty: 30 TABLET | Refills: 5 | Status: SHIPPED | OUTPATIENT
Start: 2021-09-22 | End: 2022-01-20 | Stop reason: SDUPTHER

## 2021-09-23 LAB
BETA 2 GLYCOPROT.1 IGA AB: <10 SAU (ref 0–20)
BETA 2 GLYCOPROT.1 IGM AB: <10 SMU (ref 0–20)
BETA-2 GLYCOPROTEIN 1 IGG ANTIBODY: <10 SGU (ref 0–20)
MTHFR BY PCR SPECIMEN: ABNORMAL
MTHFR INTERPRETATION: ABNORMAL
MTHFR MUTATION A1298C: NEGATIVE
MTHFR MUTATION C677T: ABNORMAL

## 2021-09-24 LAB — PROTHROMBIN G20210A MUTATION: NEGATIVE

## 2021-09-25 LAB
FACTOR V LEIDEN: NEGATIVE
PHOSPHATIDYLSERINE IGA ANTIBODY: 0 APS (ref 0–19)
PHOSPHATIDYLSERINE IGG ANTIBODY: 0 GPS (ref 0–15)
PHOSPHATIDYLSERINE IGM ANTIBODY: 2 MPS (ref 0–21)

## 2021-09-27 DIAGNOSIS — I82.562 CHRONIC DEEP VEIN THROMBOSIS (DVT) OF CALF MUSCLE VEIN OF LEFT LOWER EXTREMITY (HCC): Primary | ICD-10-CM

## 2021-09-27 LAB
ANTICARDIOLIPIN IGA ANTIBODY: <10 APL (ref 0–11)
ANTICARDIOLIPIN IGG ANTIBODY: <10 GPL (ref 0–14)
CARDIOLIPIN AB IGM: 43 MPL (ref 0–12)

## 2022-01-20 ENCOUNTER — OFFICE VISIT (OUTPATIENT)
Dept: ONCOLOGY | Age: 57
End: 2022-01-20
Payer: COMMERCIAL

## 2022-01-20 ENCOUNTER — HOSPITAL ENCOUNTER (OUTPATIENT)
Dept: INFUSION THERAPY | Age: 57
Discharge: HOME | End: 2022-01-20
Payer: COMMERCIAL

## 2022-01-20 VITALS
BODY MASS INDEX: 39.17 KG/M2 | OXYGEN SATURATION: 96 % | DIASTOLIC BLOOD PRESSURE: 72 MMHG | TEMPERATURE: 98.2 F | SYSTOLIC BLOOD PRESSURE: 128 MMHG | HEART RATE: 78 BPM | HEIGHT: 75 IN | WEIGHT: 315 LBS

## 2022-01-20 DIAGNOSIS — G47.33 OSA ON CPAP: ICD-10-CM

## 2022-01-20 DIAGNOSIS — R53.83 OTHER FATIGUE: ICD-10-CM

## 2022-01-20 DIAGNOSIS — I82.462 ACUTE DEEP VEIN THROMBOSIS (DVT) OF CALF MUSCLE VEIN OF LEFT LOWER EXTREMITY (HCC): Primary | ICD-10-CM

## 2022-01-20 DIAGNOSIS — Z99.89 OSA ON CPAP: ICD-10-CM

## 2022-01-20 DIAGNOSIS — D86.0 SARCOIDOSIS OF LUNG (HCC): ICD-10-CM

## 2022-01-20 DIAGNOSIS — I82.562 CHRONIC DEEP VEIN THROMBOSIS (DVT) OF CALF MUSCLE VEIN OF LEFT LOWER EXTREMITY (HCC): ICD-10-CM

## 2022-01-20 LAB
ALBUMIN SERPL-MCNC: 4.1 GM/DL (ref 3.4–5)
ALP BLD-CCNC: 93 IU/L (ref 40–129)
ALT SERPL-CCNC: 28 U/L (ref 10–40)
ANION GAP SERPL CALCULATED.3IONS-SCNC: 10 MMOL/L (ref 4–16)
AST SERPL-CCNC: 31 IU/L (ref 15–37)
BASOPHILS ABSOLUTE: 0.1 K/CU MM
BASOPHILS RELATIVE PERCENT: 0.7 % (ref 0–1)
BILIRUB SERPL-MCNC: 0.5 MG/DL (ref 0–1)
BUN BLDV-MCNC: 18 MG/DL (ref 6–23)
CALCIUM SERPL-MCNC: 9.3 MG/DL (ref 8.3–10.6)
CHLORIDE BLD-SCNC: 101 MMOL/L (ref 99–110)
CO2: 25 MMOL/L (ref 21–32)
CREAT SERPL-MCNC: 0.9 MG/DL (ref 0.9–1.3)
DIFFERENTIAL TYPE: ABNORMAL
EOSINOPHILS ABSOLUTE: 0.2 K/CU MM
EOSINOPHILS RELATIVE PERCENT: 3 % (ref 0–3)
GFR AFRICAN AMERICAN: >60 ML/MIN/1.73M2
GFR NON-AFRICAN AMERICAN: >60 ML/MIN/1.73M2
GLUCOSE BLD-MCNC: 100 MG/DL (ref 70–99)
HCT VFR BLD CALC: 49.3 % (ref 42–52)
HEMOGLOBIN: 17.2 GM/DL (ref 13.5–18)
LYMPHOCYTES ABSOLUTE: 1.5 K/CU MM
LYMPHOCYTES RELATIVE PERCENT: 19.5 % (ref 24–44)
MCH RBC QN AUTO: 29 PG (ref 27–31)
MCHC RBC AUTO-ENTMCNC: 34.9 % (ref 32–36)
MCV RBC AUTO: 83 FL (ref 78–100)
MONOCYTES ABSOLUTE: 0.7 K/CU MM
MONOCYTES RELATIVE PERCENT: 9.2 % (ref 0–4)
PDW BLD-RTO: 14 % (ref 11.7–14.9)
PLATELET # BLD: 168 K/CU MM (ref 140–440)
PMV BLD AUTO: 10.4 FL (ref 7.5–11.1)
POTASSIUM SERPL-SCNC: 4.1 MMOL/L (ref 3.5–5.1)
RBC # BLD: 5.94 M/CU MM (ref 4.6–6.2)
SEGMENTED NEUTROPHILS ABSOLUTE COUNT: 5.1 K/CU MM
SEGMENTED NEUTROPHILS RELATIVE PERCENT: 67.6 % (ref 36–66)
SODIUM BLD-SCNC: 136 MMOL/L (ref 135–145)
TOTAL PROTEIN: 7.2 GM/DL (ref 6.4–8.2)
WBC # BLD: 7.6 K/CU MM (ref 4–10.5)

## 2022-01-20 PROCEDURE — 86147 CARDIOLIPIN ANTIBODY EA IG: CPT

## 2022-01-20 PROCEDURE — 99214 OFFICE O/P EST MOD 30 MIN: CPT | Performed by: INTERNAL MEDICINE

## 2022-01-20 PROCEDURE — 85025 COMPLETE CBC W/AUTO DIFF WBC: CPT

## 2022-01-20 PROCEDURE — 80053 COMPREHEN METABOLIC PANEL: CPT

## 2022-01-20 PROCEDURE — 36415 COLL VENOUS BLD VENIPUNCTURE: CPT

## 2022-01-20 RX ORDER — FOLIC ACID 1 MG/1
1 TABLET ORAL DAILY
Qty: 90 TABLET | Refills: 5 | Status: SHIPPED | OUTPATIENT
Start: 2022-01-20 | End: 2022-04-20

## 2022-01-20 ASSESSMENT — PATIENT HEALTH QUESTIONNAIRE - PHQ9
SUM OF ALL RESPONSES TO PHQ QUESTIONS 1-9: 0
1. LITTLE INTEREST OR PLEASURE IN DOING THINGS: 0
SUM OF ALL RESPONSES TO PHQ QUESTIONS 1-9: 0
2. FEELING DOWN, DEPRESSED OR HOPELESS: 0
SUM OF ALL RESPONSES TO PHQ9 QUESTIONS 1 & 2: 0

## 2022-01-20 NOTE — PROGRESS NOTES
MA Rooming Questions  Patient: Tono Viera Current  MRN: [id number]    Date: 1/20/2022        1. Do you have any new issues?   no         2. Do you need any refills on medications? yes - Folic acid and Xarelto if he is continuing both Medications    3. Have you had any imaging done since your last visit?   no    4. Have you been hospitalized or seen in the emergency room since your last visit here?   no    5. Did the patient have a depression screening completed today?  Yes    PHQ-9 Total Score: 0 (1/20/2022  9:27 AM)       PHQ-9 Given to (if applicable):               PHQ-9 Score (if applicable):                     [] Positive     [x]  Negative              Does question #9 need addressed (if applicable)                     [] Yes    []  No               Lester Signs, CMA

## 2022-01-20 NOTE — PROGRESS NOTES
Patient Name:  Remigio Mann Current  Patient :  1965  Patient MRN:  [id number]     Primary Oncologist: Margareth Sanderson MD  Referring Provider: Cecilio Moreland MD     Date of Service: 2022      Reason for Consult:  Left lower ext DVT     Chief Complaint:    Chief Complaint   Patient presents with    Follow-up       Encounter Diagnoses   Name Primary?  Acute deep vein thrombosis (DVT) of calf muscle vein of left lower extremity (HCC) Yes    Sarcoidosis of lung (HCC)     TOSHA on CPAP     Chronic deep vein thrombosis (DVT) of calf muscle vein of left lower extremity (HCC)         HPI:   2021 he arrived with his wife to the clinic today. Reported in 2021 he had left calf cramps. Denied any swelling, pain, fever or redness. In 2021 he had left meniscal tear repair. He was sitting bit more than usual.  Denied any long distance travel. No steroids or any testosterone. No thromboembolic phenomena in the past.  No family history of thromboembolism. Denied any night sweats, weight loss or any lymphadenopathy. Denied any chest pain. Shortness of breath is related to sarcoidosis and is stable. Denied any cough, palpitations or dizziness. Denied any dysphagia odynophagia. Denied any abdominal pain, nausea, vomiting, diarrhea, constipation. Denied any  symptoms. Denied any bleeding. Reported that lately he has been having left lower extremity cramps. He had Covid end of 2021. Colonoscopy at the age of 50 they found small polyps and was recommended to repeat colonoscopy in 5 years. 2020 MRI of the left knee with radial free edge tear posterior horn medial meniscus. Tricompartment knee joint degenerative change particularly involving the patellofemoral joint. Knee joint effusion and leaking popliteal bursitis.   Mild quadriceps tendinopathy     3/18/2021 left lower extremity Dopplers with acute small segment occlusive venous thrombosis in the left gastro numerous vein    Started on Xarelto    8/16/2021 left lower extremity Dopplers with chronic appearing thrombus within the proximal left gastro numerous vein. These similar in distribution to the thrombus seen in 3/18/2021 examination no other sonographic findings of significant left lower extremity venous thrombus identified. Sep 2021 cardiolipin IgM 43    Past Medical History:     Sarcoidosis, asthma, GERD  Is on Advair and no other treatment for sarcoidosis since 2007                                                       Past Surgery History:    Left meniscal tear repair and cholecystectomy                                                                         Social History:   Lives with his wife has 2 children. Is a  at [employer]. Quit smoking 2000 prior to which she smoked 1 pack/day for 8 to 9 years. Denied any history of alcohol abuse or any other illicit drug abuse. Family History:    No family history of thromboembolism, mother with skin cancer. Maternal aunts both of them with breast cancer. Maternal grandmother with breast cancer.                                                                                             Allergies   Allergen Reactions    Other      Allergic to Pollen causing sneezing    Vicodin [Hydrocodone-Acetaminophen] Nausea Only     States not allergic, just gets nauseated with it       Current Outpatient Medications on File Prior to Visit   Medication Sig Dispense Refill    fluticasone-salmeterol (ADVAIR DISKUS) 100-50 MCG/DOSE diskus inhaler Inhale 1 puff into the lungs every 12 hours 60 each 5    folic acid (FOLVITE) 1 MG tablet Take 1 tablet by mouth daily 30 tablet 5    pantoprazole (PROTONIX) 40 MG tablet Take 1 tablet by mouth daily 90 tablet 3    rivaroxaban (XARELTO) 20 MG TABS tablet Take 1 tablet by mouth daily (with breakfast) To start 20mg 1 tablet daily (once completed with 21 days of 15mg bid) for acute left leg DVT 90 tablet 1    fluticasone (FLONASE) 50 MCG/ACT nasal spray 2 sprays by Nasal route daily 1 Bottle 5    Fexofenadine HCl (MUCINEX ALLERGY PO) Take 1 tablet by mouth as needed      albuterol sulfate HFA (PROAIR HFA) 108 (90 Base) MCG/ACT inhaler Inhale 2 puffs into the lungs every 6 hours as needed for Wheezing 3 Inhaler 0    Nutritional Supplements (JUICE PLUS FIBRE PO) Take by mouth       No current facility-administered medications on file prior to visit. Interval history:1/20/22: Arrived with his wife to the clinic today. Reported that he feels tired, working long hrs. Gaining weight. No LE edema, pain or any erythema. No chest pain, increased sob, fever, cough. No bleeding. No abdominal pain. No knew. More active at work. No abdominal pain. No bleeding. No Gu sx. Review of Systems:  Per interval history.    Vital Signs: /72 (Site: Right Upper Arm, Position: Sitting, Cuff Size: Medium Adult)   Pulse 78   Temp 98.2 °F (36.8 °C) (Infrared)   Ht 6' 3\" (1.905 m)   Wt (!) 329 lb 9.6 oz (149.5 kg)   SpO2 96%   BMI 41.20 kg/m²      CONSTITUTIONAL: awake, alert, cooperative, no apparent distress   EYES: MARINA, No pallor or any icterus  ENT: ATNC  NECK: No JVD  HEMATOLOGIC/LYMPHATIC: no cervical, supraclavicular or axillary lymphadenopathy   LUNGS: CTAB  CARDIOVASCULAR: s1s2 rrr no murmurs  ABDOMEN: soft ntnd bs pos  MUSCULOSKELETAL: full range of motion noted, tone is normal  NEUROLOGIC: GI  SKIN: No rash  EXTREMITIES: no LE edema bilaterally     Labs:  Hematology:  Lab Results   Component Value Date    WBC 7.9 09/20/2021    RBC 5.69 09/20/2021    HGB 16.7 09/20/2021    HCT 47.6 09/20/2021    MCV 83.7 09/20/2021    MCH 29.3 09/20/2021    MCHC 35.1 09/20/2021    RDW 14.2 09/20/2021     09/20/2021    MPV 10.7 09/20/2021    SEGSPCT 69.1 (H) 09/20/2021    EOSRELPCT 3.4 (H) 09/20/2021    BASOPCT 0.6 09/20/2021    LYMPHOPCT 16.7 (L) 09/20/2021    MONOPCT 10.2 (H) 09/20/2021    SEGSABS 5.4 09/20/2021    EOSABS 0.3 09/20/2021    BASOSABS 0.1 09/20/2021    LYMPHSABS 1.3 09/20/2021    MONOSABS 0.8 09/20/2021    DIFFTYPE AUTOMATED DIFFERENTIAL 09/20/2021     No results found for: ESR  Chemistry:  Lab Results   Component Value Date     09/20/2021    K 4.3 09/20/2021     09/20/2021    CO2 23 09/20/2021    BUN 12 09/20/2021    CREATININE 0.9 09/20/2021    GLUCOSE 96 09/20/2021    CALCIUM 9.4 09/20/2021    PROT 6.8 09/20/2021    LABALBU 4.4 09/20/2021    BILITOT 0.6 09/20/2021    ALKPHOS 99 09/20/2021    AST 32 09/20/2021    ALT 34 09/20/2021    LABGLOM >60 09/20/2021    GFRAA >60 09/20/2021    AGRATIO 2.0 08/10/2019    GLOB 2.2 08/10/2019     Lab Results   Component Value Date    HOMOCYSTEINE 10.3 (H) 09/20/2021     No components found for: LD  Lab Results   Component Value Date    TSHHS 1.840 08/10/2018     Immunology:  Lab Results   Component Value Date    PROT 6.8 09/20/2021     No results found for: GINGER King  No results found for: B2M  Coagulation Panel:  No results found for: PROTIME, INR, APTT, DDIMER  Anemia Panel:  No results found for: MENXXCEN02, FOLATE  Tumor Markers:  Lab Results   Component Value Date    PSA 1.95 05/15/2021        Observations:  ECOG:  PHQ-9 Total Score: 0 (1/20/2022  9:27 AM)       Assessment & Plan:                                                          Left lower extremity DVT most probably provoked secondary left meniscal tear repair just a month prior to the diagnosis, he was started on Xarelto. Note repeat ultrasound with chronic thrombus which is not an indication for continuation of anticoagulation. Nonetheless hypercoagulable work-up has been ordered to determine the duration of anticoagulation. Anticardiolipin ab IgM elevated in sep 2021.  Will repeat levels and if high again then consider long term ac vs trial of discontinuation but discussed that he is at increased risk for venous thromboembolism including PE. Continue folic acid. Monitor for any bleeding. Avoid falls or deep cuts. Recommended increased activity levels. Feet elevation if edema and compression socks. Also recommended weight loss. No signs or symptoms of underlying malignancy. Fatigue: Has h/o TOSHA, is on CPAP. R/O thyroid dysfunction. Encouraged exercise and weight loss. Continue other medical care. Thank you for letting us be part of the care and will follow along. Discussed above findings and plan with him and he voiced understanding. Answered all his questions. Discussed healthy lifestyle including healthy diet, regular exercise as tolerated. Also discussed importance of being up-to-date with age-appropriate screening tools. Colonoscopy at age 50    Recommend follow-up with primary care physician and other specialists. Please do not hesitate to contact us if you need further information. Return to clinic July 2022 or earlier if new Sx    I have recommended that the patient follow CDC guidelines for prevention of COVID-19 infection. Received COIVID vaccine but not booster.  Encouraged him to.     [place]

## 2022-01-20 NOTE — PROGRESS NOTES
Patient Name:  Ar Ovalle Current  Patient :  1965  Patient MRN:  [id number]     Primary Oncologist: Sydnie Granger MD  Referring Provider: Yemi Hodge MD     Date of Service: 2022      Reason for Consult:  Left lower ext DVT     Chief Complaint:    Chief Complaint   Patient presents with    Follow-up       No diagnosis found. HPI:   2021 he arrived with his wife to the clinic today. Reported in 2021 he had left calf cramps. Denied any swelling, pain, fever or redness. In 2021 he had left meniscal tear repair. He was sitting bit more than usual.  Denied any long distance travel. No steroids or any testosterone. No thromboembolic phenomena in the past.  No family history of thromboembolism. Denied any night sweats, weight loss or any lymphadenopathy. Denied any chest pain. Shortness of breath is related to sarcoidosis and is stable. Denied any cough, palpitations or dizziness. Denied any dysphagia odynophagia. Denied any abdominal pain, nausea, vomiting, diarrhea, constipation. Denied any  symptoms. Denied any bleeding. Reported that lately he has been having left lower extremity cramps. He had Covid end of 2021. Colonoscopy at the age of 50 they found small polyps and was recommended to repeat colonoscopy in 5 years. 2020 MRI of the left knee with radial free edge tear posterior horn medial meniscus. Tricompartment knee joint degenerative change particularly involving the patellofemoral joint. Knee joint effusion and leaking popliteal bursitis. Mild quadriceps tendinopathy     3/18/2021 left lower extremity Dopplers with acute small segment occlusive venous thrombosis in the left gastro numerous vein    Started on Xarelto    2021 left lower extremity Dopplers with chronic appearing thrombus within the proximal left gastro numerous vein.   These similar in distribution to the thrombus seen in 3/18/2021 examination no other sonographic findings of significant left lower extremity venous thrombus identified. Past Medical History:     Sarcoidosis, asthma, GERD  Is on Advair and no other treatment for sarcoidosis since 2007                                                       Past Surgery History:    Left meniscal tear repair and cholecystectomy                                                                         Social History:   Lives with his wife has 2 children. Is a  at [employer]. Quit smoking 2000 prior to which she smoked 1 pack/day for 8 to 9 years. Denied any history of alcohol abuse or any other illicit drug abuse. Family History:    No family history of thromboembolism, mother with skin cancer. Maternal aunts both of them with breast cancer. Maternal grandmother with breast cancer.                                                                                             Allergies   Allergen Reactions    Other      Allergic to Pollen causing sneezing    Vicodin [Hydrocodone-Acetaminophen] Nausea Only     States not allergic, just gets nauseated with it       Current Outpatient Medications on File Prior to Visit   Medication Sig Dispense Refill    fluticasone-salmeterol (ADVAIR DISKUS) 100-50 MCG/DOSE diskus inhaler Inhale 1 puff into the lungs every 12 hours 60 each 5    folic acid (FOLVITE) 1 MG tablet Take 1 tablet by mouth daily 30 tablet 5    pantoprazole (PROTONIX) 40 MG tablet Take 1 tablet by mouth daily 90 tablet 3    rivaroxaban (XARELTO) 20 MG TABS tablet Take 1 tablet by mouth daily (with breakfast) To start 20mg 1 tablet daily (once completed with 21 days of 15mg bid) for acute left leg DVT 90 tablet 1    fluticasone (FLONASE) 50 MCG/ACT nasal spray 2 sprays by Nasal route daily 1 Bottle 5    Fexofenadine HCl (MUCINEX ALLERGY PO) Take 1 tablet by mouth as needed      albuterol sulfate HFA (PROAIR HFA) 108 (90 Base) MCG/ACT inhaler Inhale 2 puffs into the lungs every 6 hours as needed for Wheezing 3 Inhaler 0    Nutritional Supplements (JUICE PLUS FIBRE PO) Take by mouth       No current facility-administered medications on file prior to visit. Review of Systems:    Constitutional:  No weight loss, No fever, No chills, No night sweats. Energy level good. Eyes:  No diplopia, No transient or permanent loss of vision, No scotomata. ENT / Mouth:  No epistaxis, No dysphagia, No hoarseness, No oral ulcers, No gingival bleeding. No sore throat, No postnasal drip, No nasal drip, No mouth pain, No sinus pain, No tinnitus, Normal hearing. Cardiovascular:  No chest pain, No palpitations, No syncope, No upper extremity edema, No lower extremity edema, No calf discomfort. Respiratory:  No cough. No hemoptysis, No pleurisy, No wheezing, No dyspnea. Gastrointestinal:  No abdominal pain, No abdominal cramping, No nausea, No vomiting, No constipation, No diarrhea, No hematochezia, No melena, No jaundice, No dyspepsia, No dysphagia. Urinary:  No dysuria, No hematuria, No urinary incontinence. Musculoskeletal:   Left calf cramps  Skin:  No rash, No nodules, No pruritus, No lesions. Neurologic:  No confusion, No seizures, No syncope, No tremor, No speech change, No headache, No hiccups, No abnormal gait, No sensory changes, No weakness. Psychiatric:  No depression, No anxiety, Concentration normal.  Endocrine:  No polyuria, No polydipsia, No hot flashes, No thyroid symptoms. Hematologic:  No epistaxis, No gingival bleeding, No petechiae, No ecchymosis. Lymphatic:  No lymphadenopathy, No lymphedema. Allergy / Immunologic:  No eczema, No frequent mucous infections, No frequent respiratory infections, No recurrent urticarial, No frequent skin infections.      Vital Signs: /72 (Site: Right Upper Arm, Position: Sitting, Cuff Size: Medium Adult)   Pulse 78   Temp 98.2 °F KLFLCR  No results found for: B2M  Coagulation Panel:  No results found for: PROTIME, INR, APTT, DDIMER  Anemia Panel:  No results found for: [id number], FOLATE  Tumor Markers:  Lab Results   Component Value Date    PSA 1.95 05/15/2021        Observations:  ECOG:  PHQ-9 Total Score: 0 (1/20/2022  9:27 AM)       Assessment & Plan:                                                          Left lower extremity DVT most probably provoked secondary left meniscal tear repair just a month prior to the diagnosis, he was started on Xarelto. Note repeat ultrasound with chronic thrombus which is not an indication for continuation of anticoagulation. Nonetheless hypercoagulable work-up has been ordered to determine the duration of anticoagulation. Ideally would continue for 3 months pending the hypercoagulable work-up and also symptoms. Monitor for any bleeding. Avoid falls or deep cuts. Recommended increased activity levels. Feet elevation if edema and compression socks. Also recommended weight loss. No signs or symptoms of underlying malignancy. Continue other medical care. Thank you for letting us be part of the care and will follow along. Discussed above findings and plan with him and he voiced understanding. Answered all his questions. Discussed healthy lifestyle including healthy diet, regular exercise as tolerated. Also discussed importance of being up-to-date with age-appropriate screening tools. Recommend follow-up with primary care physician and other specialists. Please do not hesitate to contact us if you need further information. Return to clinic or earlier if new Sx    I have recommended that the patient follow CDC guidelines for prevention of COVID-19 infection.     [address]    Electronically signed by KEIRA Pulido CNP on 1/20/2022 at 9:57 AM

## 2022-01-22 LAB
ANTICARDIOLIPIN IGA ANTIBODY: <10 APL
ANTICARDIOLIPIN IGG ANTIBODY: <10 GPL
CARDIOLIPIN AB IGM: <10 MPL

## 2022-02-04 ENCOUNTER — TELEPHONE (OUTPATIENT)
Dept: FAMILY MEDICINE CLINIC | Age: 57
End: 2022-02-04

## 2022-02-04 ENCOUNTER — NURSE TRIAGE (OUTPATIENT)
Dept: OTHER | Facility: CLINIC | Age: 57
End: 2022-02-04

## 2022-02-04 RX ORDER — DOXYCYCLINE HYCLATE 100 MG
100 TABLET ORAL 2 TIMES DAILY
Qty: 20 TABLET | Refills: 0 | Status: SHIPPED | OUTPATIENT
Start: 2022-02-04 | End: 2022-02-14

## 2022-02-04 NOTE — TELEPHONE ENCOUNTER
Received call from [facility] at [facility] with [facility]. Subjective: A few days ago took hat off and had wound on head (left side) catty corner from eye. Red/swollen/painful to touch. Unsure how he got it. Now has developed swelling in jaw line below wound. Took rapid Covid test today and was negative. Current Symptoms: lump on left jaw, area where wisdom teeth would be. Wound on top of head red/swollen/painful, quarter size. Onset: 4 days ago; gradual, wound is unchanged, but lump in jaw is new. Associated Symptoms: NA    Pain Severity: painful to touch, but pt not present to rate. Temperature: no fever    What has been tried: antihistamines, Flonase, Mucinex    LMP: NA Pregnant: NA    Recommended disposition: see provider w/i 24 hours. Care advice provided, patient verbalizes understanding; denies any other questions or concerns; instructed to call back for any new or worsening symptoms. Writer provided warm transfer to [employer] at [facility] for appointment scheduling     Attention Provider: Thank you for allowing me to participate in the care of your patient. The patient was connected to triage in response to information provided to the ECC/PSC. Please do not respond through this encounter as the response is not directed to a shared pool.             Reason for Disposition   [1] Swelling is painful to touch AND [2] no fever    Protocols used: SKIN LUMP OR LOCALIZED SWELLING-ADULT-AH

## 2022-02-04 NOTE — TELEPHONE ENCOUNTER
Received call from patient's wife that patient has open wound on scalp and new nodule formed rapidly along lower jaw line.       Spoke with wife to encourage patient being evaluated in person but sent doxycycline to [facility] per her request.

## 2022-06-13 DIAGNOSIS — K21.9 GASTROESOPHAGEAL REFLUX DISEASE WITHOUT ESOPHAGITIS: ICD-10-CM

## 2022-06-14 RX ORDER — PANTOPRAZOLE SODIUM 40 MG/1
TABLET, DELAYED RELEASE ORAL
Qty: 90 TABLET | Refills: 3 | Status: SHIPPED | OUTPATIENT
Start: 2022-06-14

## 2022-10-04 ENCOUNTER — TELEPHONE (OUTPATIENT)
Dept: FAMILY MEDICINE CLINIC | Age: 57
End: 2022-10-04

## 2022-10-04 DIAGNOSIS — D86.0 SARCOIDOSIS OF LUNG (HCC): ICD-10-CM

## 2022-10-04 DIAGNOSIS — E66.01 SEVERE OBESITY (BMI 35.0-39.9) WITH COMORBIDITY (HCC): Primary | ICD-10-CM

## 2022-10-04 DIAGNOSIS — Z99.89 OSA ON CPAP: ICD-10-CM

## 2022-10-04 DIAGNOSIS — G47.33 OSA ON CPAP: ICD-10-CM

## 2022-10-04 DIAGNOSIS — J45.40 MODERATE PERSISTENT ASTHMA WITHOUT COMPLICATION: ICD-10-CM

## 2022-10-04 NOTE — TELEPHONE ENCOUNTER
Current, Gisell VENEGAS Srmdorinda Jose (supporting Fanny Jackson MD) 37 minutes ago (7:42 AM)     Walker Aguilera is weighing at 317. He hasnt lost any weight. He said he is hungry. We tried hypnosis  he did nutrisystem. Is there anything he can take that is safe to help him over come this urge to eat? I think if he can get some off he will be better to do on his own. Our next step is probably surgery. If that is the case how do you pick the right place to go? He is having all kinds of pains and I know it is the weight.  Thanks    Received from [facility] [place]

## 2022-10-05 NOTE — TELEPHONE ENCOUNTER
Team to please call the [facility] Weight management team to see if part of their management includes weight loss medications and then we can refer patient to them

## 2023-02-23 NOTE — TELEPHONE ENCOUNTER
Please let patient know our [facility] Weight Management Team does prescribe meds if needed as part of their program.  I can make the referral to them if patient would like a consult to learn about their program. Well appearing, awake, alert, oriented to person, place, time/situation and in no apparent distress. normal...

## 2023-03-14 ENCOUNTER — PATIENT MESSAGE (OUTPATIENT)
Dept: FAMILY MEDICINE CLINIC | Age: 58
End: 2023-03-14

## 2023-03-14 DIAGNOSIS — G47.33 OSA ON CPAP: ICD-10-CM

## 2023-03-14 DIAGNOSIS — Z99.89 OSA ON CPAP: ICD-10-CM

## 2023-03-14 DIAGNOSIS — E66.01 CLASS 2 SEVERE OBESITY DUE TO EXCESS CALORIES WITH SERIOUS COMORBIDITY AND BODY MASS INDEX (BMI) OF 39.0 TO 39.9 IN ADULT (HCC): Primary | ICD-10-CM

## 2023-03-14 NOTE — TELEPHONE ENCOUNTER
From: Jon Jalloh Current  To: Dr. Watters Pour: 3/14/2023 2:10 PM EDT  Subject: Weight    I checked [facility] P[facility]rez and it covers Ozempic and Mounjaro for $25 at [facility] in [address] feel these are safe and can help him we  Will use what you feel is best.    Also Dr Alvino George had him on a scrip for Folic Acid to help prevent Blood clots and he is out can you call this in to [facility] for 1mg    Thanks

## 2023-03-16 RX ORDER — FOLIC ACID 1 MG/1
1 TABLET ORAL DAILY
Qty: 90 TABLET | Refills: 3 | Status: SHIPPED | OUTPATIENT
Start: 2023-03-16 | End: 2023-06-14

## 2023-04-25 ENCOUNTER — OFFICE VISIT (OUTPATIENT)
Dept: FAMILY MEDICINE CLINIC | Age: 58
End: 2023-04-25
Payer: COMMERCIAL

## 2023-04-25 VITALS
HEART RATE: 76 BPM | DIASTOLIC BLOOD PRESSURE: 76 MMHG | OXYGEN SATURATION: 95 % | WEIGHT: 302.6 LBS | HEIGHT: 75 IN | BODY MASS INDEX: 37.63 KG/M2 | SYSTOLIC BLOOD PRESSURE: 128 MMHG

## 2023-04-25 DIAGNOSIS — Z13.6 ENCOUNTER FOR LIPID SCREENING FOR CARDIOVASCULAR DISEASE: ICD-10-CM

## 2023-04-25 DIAGNOSIS — E66.01 CLASS 2 SEVERE OBESITY DUE TO EXCESS CALORIES WITH SERIOUS COMORBIDITY AND BODY MASS INDEX (BMI) OF 37.0 TO 37.9 IN ADULT (HCC): Primary | ICD-10-CM

## 2023-04-25 DIAGNOSIS — Z13.220 ENCOUNTER FOR LIPID SCREENING FOR CARDIOVASCULAR DISEASE: ICD-10-CM

## 2023-04-25 DIAGNOSIS — D86.0 SARCOIDOSIS OF LUNG (HCC): ICD-10-CM

## 2023-04-25 DIAGNOSIS — Z13.1 SCREENING FOR DIABETES MELLITUS: ICD-10-CM

## 2023-04-25 DIAGNOSIS — Z12.5 SCREENING FOR PROSTATE CANCER: ICD-10-CM

## 2023-04-25 PROBLEM — I82.562 CHRONIC DEEP VEIN THROMBOSIS (DVT) OF CALF MUSCLE VEIN OF LEFT LOWER EXTREMITY (HCC): Status: RESOLVED | Noted: 2021-09-20 | Resolved: 2023-04-25

## 2023-04-25 PROBLEM — I82.462 ACUTE DEEP VEIN THROMBOSIS (DVT) OF CALF MUSCLE VEIN OF LEFT LOWER EXTREMITY (HCC): Status: RESOLVED | Noted: 2021-03-18 | Resolved: 2023-04-25

## 2023-04-25 PROCEDURE — 99213 OFFICE O/P EST LOW 20 MIN: CPT | Performed by: FAMILY MEDICINE

## 2023-04-25 SDOH — ECONOMIC STABILITY: HOUSING INSECURITY
IN THE LAST 12 MONTHS, WAS THERE A TIME WHEN YOU DID NOT HAVE A STEADY PLACE TO SLEEP OR SLEPT IN A SHELTER (INCLUDING NOW)?: NO

## 2023-04-25 SDOH — ECONOMIC STABILITY: FOOD INSECURITY: WITHIN THE PAST 12 MONTHS, YOU WORRIED THAT YOUR FOOD WOULD RUN OUT BEFORE YOU GOT MONEY TO BUY MORE.: NEVER TRUE

## 2023-04-25 SDOH — ECONOMIC STABILITY: INCOME INSECURITY: HOW HARD IS IT FOR YOU TO PAY FOR THE VERY BASICS LIKE FOOD, HOUSING, MEDICAL CARE, AND HEATING?: NOT HARD AT ALL

## 2023-04-25 SDOH — ECONOMIC STABILITY: FOOD INSECURITY: WITHIN THE PAST 12 MONTHS, THE FOOD YOU BOUGHT JUST DIDN'T LAST AND YOU DIDN'T HAVE MONEY TO GET MORE.: NEVER TRUE

## 2023-04-25 ASSESSMENT — PATIENT HEALTH QUESTIONNAIRE - PHQ9
1. LITTLE INTEREST OR PLEASURE IN DOING THINGS: 0
SUM OF ALL RESPONSES TO PHQ QUESTIONS 1-9: 0
SUM OF ALL RESPONSES TO PHQ QUESTIONS 1-9: 0
2. FEELING DOWN, DEPRESSED OR HOPELESS: 0
SUM OF ALL RESPONSES TO PHQ QUESTIONS 1-9: 0
SUM OF ALL RESPONSES TO PHQ9 QUESTIONS 1 & 2: 0
SUM OF ALL RESPONSES TO PHQ QUESTIONS 1-9: 0

## 2023-04-25 NOTE — PROGRESS NOTES
Plan:   Patient is struggling with his weight loss but is trying to be very mindful about his carbohydrate intake. He would like to try semaglutide for weight management. He will continue a low-carb diet. Screening labs as ordered. He will follow with pulmonology for his CPAP and sarcoid. Anxiety is well controlled without medicine. We will continue to follow clinically. 1. Class 2 severe obesity due to excess calories with serious comorbidity and body mass index (BMI) of 37.0 to 37.9 in adult ([facility]  -     Semaglutide-Weight Management (WEGOVY) 0.25 MG/0.5ML SOAJ SC injection; Inject 0.25 mg into the skin every 7 days, Disp-2 mL, R-5Print  -     TSH; Future  2. Sarcoidosis of lung (HCC)  -     TSH; Future  -     Basic Metabolic Panel; Future  -     CBC; Future  3. Encounter for lipid screening for cardiovascular disease  -     Lipid, Fasting; Future  4. Screening for diabetes mellitus  -     Hemoglobin A1C; Future  5. Screening for prostate cancer  -     PSA Screening; Future      Work/ school status:   Restrictions: full duty  today    Discussed use, benefit, and side effects of prescribed medications. Patient instructed to notify if develop side effects from medications. Barriers to medication compliance addressed. All patient questions answered. Pt voiced understanding. Return in about 1 year (around 4/25/2024) for Wellness physical.  -----------------------------------------------------------------------------------------------            Chief Complaint   Patient presents with    Discuss Medications     Discuss ozempic prescription    Orders     Requesting lab working including PSA   No longer following with Dr. Jay Barnett for DVT and finsihed xarelto for a year and will stay on folic acid. Following with Dr. Karma El for sarcoid and CPAP. Working on lower carb diet but is unhappy with the rate at which he is losing weight. He feels that his weight loss will help him feel better overall.   Some

## 2023-05-08 ENCOUNTER — PATIENT MESSAGE (OUTPATIENT)
Dept: FAMILY MEDICINE CLINIC | Age: 58
End: 2023-05-08

## 2023-05-08 DIAGNOSIS — L85.3 DRY SKIN DERMATITIS: Primary | ICD-10-CM

## 2023-05-10 LAB
HBA1C MFR BLD: 4.7 % (ref 4–6)
HCT VFR BLD CALC: 51.7 % (ref 37.5–51)
HEMOGLOBIN: 17.7 G/DL (ref 13–17.7)
MCH RBC QN AUTO: 29.4 PG (ref 26–34)
MCHC RBC AUTO-ENTMCNC: 34.2 G/DL (ref 30.7–35.5)
MCV RBC AUTO: 85.7 FL (ref 80–100)
PDW BLD-RTO: 12.7 %
PLATELET # BLD: 156 K/UL (ref 140–400)
PMV BLD AUTO: 11.6 FL (ref 7.2–11.7)
RBC # BLD: 6.03 M/UL (ref 4.14–5.8)
WBC: 7.5 K/UL (ref 3.5–10.9)

## 2023-05-11 LAB
BUN BLDV-MCNC: 16 MG/DL (ref 3–29)
BUN/CREAT BLD: 18 (ref 7–25)
CALCIUM SERPL-MCNC: 10 MG/DL (ref 8.5–10.5)
CHLORIDE BLD-SCNC: 105 MEQ/L (ref 96–110)
CHOLESTEROL: 170 MG/DL
CO2: 21 MEQ/L (ref 19–32)
CREAT SERPL-MCNC: 0.9 MG/DL (ref 0.5–1.4)
FASTING STATUS: NORMAL
GLOMERULAR FILTRATION RATE: 101 MLS/MIN/1.73M2
GLUCOSE BLD-MCNC: 89 MG/DL (ref 70–99)
HDLC SERPL-MCNC: 39 MG/DL
LDL CHOLESTEROL CALCULATED: 107 MG/DL
POTASSIUM SERPL-SCNC: 4.4 MEQ/L (ref 3.4–5.3)
PROSTATE SPECIFIC ANTIGEN: 2.32 NG/ML
SODIUM BLD-SCNC: 145 MEQ/L (ref 135–148)
TRIGL SERPL-MCNC: 119 MG/DL
TSH SERPL DL<=0.05 MIU/L-ACNC: 2.13 MCIU/ML (ref 0.4–4.5)
VLDLC SERPL CALC-MCNC: 24 MG/DL (ref 4–38)

## 2023-05-22 RX ORDER — CLOBETASOL PROPIONATE 0.5 MG/G
CREAM TOPICAL EVERY 12 HOURS
Qty: 45 G | Refills: 1 | Status: SHIPPED | OUTPATIENT
Start: 2023-05-22

## 2023-10-23 ENCOUNTER — PATIENT MESSAGE (OUTPATIENT)
Dept: FAMILY MEDICINE CLINIC | Age: 58
End: 2023-10-23

## 2023-10-24 NOTE — TELEPHONE ENCOUNTER
From: Curry Husain Current  To: Dr. Henson: 10/23/2023 6:02 PM EDT  Subject: Weight    FYI I have been taking the semaglutide and I am down to 266. Things are coming along good    I had my FLU vaccine at [facility] on 10/15/2023. Please update my records.   Thanks

## 2024-01-09 ENCOUNTER — PATIENT MESSAGE (OUTPATIENT)
Dept: FAMILY MEDICINE CLINIC | Age: 59
End: 2024-01-09

## 2024-01-09 NOTE — TELEPHONE ENCOUNTER
From: Seven FREEMAN Current  To: Dr. Sarina Spears  Sent: 1/9/2024 2:18 PM EST  Subject: Marilee and Eye    Two things  Trying to see if Allan can continue with Dr Hunt. His insurance says he is not covered any longer. If he has an issue until we find out or if we need to find a new specialist we may need help from you    On his eye with his ocular nerve he was diagnosed with Nonateritic Ischemic Optic Neuropathy.  FREEDOM

## 2024-01-10 PROBLEM — H47.019 ISCHEMIC OPTIC NEUROPATHY, UNSPECIFIED EYE: Status: ACTIVE | Noted: 2024-01-10

## 2024-02-14 ENCOUNTER — PATIENT MESSAGE (OUTPATIENT)
Dept: FAMILY MEDICINE CLINIC | Age: 59
End: 2024-02-14

## 2024-02-15 NOTE — TELEPHONE ENCOUNTER
From: Seven FREEMAN Current  To: Dr. Sarina Spears  Sent: 2/14/2024 8:51 PM EST  Subject: Update    Dr Hunt is covered again. Tiffany apologized and said the letter was sent in error.    On the eye diagnosis nonarteric ischemic ocular neuropathy , Wonder if caused by in the shingles vaccine? Concerned on getting the 2nd one if this was from the first shot. Thoughts?

## 2024-02-17 LAB
BASOPHILS ABSOLUTE: 0.1 /ΜL
BASOPHILS RELATIVE PERCENT: 0.7 %
CHOLESTEROL, FASTING: 167
EOSINOPHILS ABSOLUTE: 0.2 /ΜL
EOSINOPHILS RELATIVE PERCENT: 2.6 %
ESTIMATED AVERAGE GLUCOSE: NORMAL
FOLLICLE STIMULATING HORMONE: 17.2
HBA1C MFR BLD: 4.8 %
HCT VFR BLD CALC: 50.3 % (ref 41–53)
HDLC SERPL-MCNC: 41 MG/DL (ref 35–70)
HEMOGLOBIN: 17.1 G/DL (ref 13.5–17.5)
LDL CHOLESTEROL CALCULATED: 107 MG/DL (ref 0–160)
LUTEINIZING HORMONE: 9.1
LYMPHOCYTES ABSOLUTE: 1.6 /ΜL
LYMPHOCYTES RELATIVE PERCENT: 19.6 %
MCH RBC QN AUTO: 28.9 PG
MCHC RBC AUTO-ENTMCNC: 34 G/DL
MCV RBC AUTO: 85 FL
MONOCYTES ABSOLUTE: 0.7 /ΜL
MONOCYTES RELATIVE PERCENT: 9 %
NEUTROPHILS ABSOLUTE: 5.5 /ΜL
NEUTROPHILS RELATIVE PERCENT: 67.7 %
PLATELET # BLD: 175 K/ΜL
PMV BLD AUTO: 11 FL
PROSTATE SPECIFIC ANTIGEN: 2.48 NG/ML
RBC # BLD: 0 10^6/ΜL
TRIGLYCERIDE, FASTING: 95
TSH SERPL DL<=0.05 MIU/L-ACNC: 1.44 UIU/ML
VITAMIN D 25-HYDROXY: 46
VITAMIN D2, 25 HYDROXY: NORMAL
VITAMIN D3,25 HYDROXY: NORMAL
WBC # BLD: 8.1 10^3/ML

## 2024-02-19 LAB — C-REACTIVE PROTEIN: <0.3

## 2024-02-21 PROBLEM — H57.9 EYE DISORDER: Status: ACTIVE | Noted: 2024-02-21

## 2024-03-08 RX ORDER — FOLIC ACID 1 MG/1
1 TABLET ORAL DAILY
Qty: 100 TABLET | Refills: 3 | Status: SHIPPED | OUTPATIENT
Start: 2024-03-08 | End: 2024-06-06

## 2024-03-08 NOTE — TELEPHONE ENCOUNTER
Last appointment: 4/25/2023   Next Appointment: 4/2/2024     Allergies:  Allergies   Allergen Reactions    Other      Allergic to Pollen causing sneezing    Vicodin [Hydrocodone-Acetaminophen] Nausea Only     States not allergic, just gets nauseated with it         Recent Vitals:  Wt Readings from Last 3 Encounters:   10/26/23 125.2 kg (276 lb)   04/25/23 (!) 137.3 kg (302 lb 9.6 oz)   03/16/23 (!) 137.4 kg (303 lb)     Ht Readings from Last 3 Encounters:   10/26/23 1.905 m (6' 3\")   04/25/23 1.905 m (6' 3\")   03/16/23 1.905 m (6' 3\")     BP Readings from Last 3 Encounters:   04/25/23 128/76   01/20/22 128/72   09/20/21 126/77     BMI Readings from Last 3 Encounters:   10/26/23 34.50 kg/m²   04/25/23 37.82 kg/m²   03/16/23 37.87 kg/m²       Recent Labs__________________________________________________________________________    Renal:   Creatinine   Date Value Ref Range Status   05/10/2023 0.9 0.5 - 1.4 MG/DL Final     BUN   Date Value Ref Range Status   05/10/2023 16 3 - 29 MG/DL Final     Sodium   Date Value Ref Range Status   05/10/2023 145 135 - 148 MEQ/L Final     Potassium   Date Value Ref Range Status   05/10/2023 4.4 3.4 - 5.3 MEQ/L Final     Chloride   Date Value Ref Range Status   05/10/2023 105 96 - 110 MEQ/L Final     CO2   Date Value Ref Range Status   05/10/2023 21 19 - 32 MEQ/L Final       BMP:    Sodium   Date Value Ref Range Status   05/10/2023 145 135 - 148 MEQ/L Final     Potassium   Date Value Ref Range Status   05/10/2023 4.4 3.4 - 5.3 MEQ/L Final     Chloride   Date Value Ref Range Status   05/10/2023 105 96 - 110 MEQ/L Final     CO2   Date Value Ref Range Status   05/10/2023 21 19 - 32 MEQ/L Final     BUN   Date Value Ref Range Status   05/10/2023 16 3 - 29 MG/DL Final     Creatinine   Date Value Ref Range Status   05/10/2023 0.9 0.5 - 1.4 MG/DL Final     Glucose   Date Value Ref Range Status   05/10/2023 89 70 - 99 MG/DL Final     Comment:     (NOTE)  The American Diabetic Association

## 2024-03-25 DIAGNOSIS — K21.9 GASTROESOPHAGEAL REFLUX DISEASE WITHOUT ESOPHAGITIS: ICD-10-CM

## 2024-03-26 RX ORDER — PANTOPRAZOLE SODIUM 40 MG/1
40 TABLET, DELAYED RELEASE ORAL DAILY
Qty: 90 TABLET | Refills: 3 | Status: SHIPPED | OUTPATIENT
Start: 2024-03-26

## 2024-04-09 ENCOUNTER — OFFICE VISIT (OUTPATIENT)
Dept: FAMILY MEDICINE CLINIC | Age: 59
End: 2024-04-09
Payer: COMMERCIAL

## 2024-04-09 VITALS
HEART RATE: 98 BPM | OXYGEN SATURATION: 96 % | HEIGHT: 75 IN | BODY MASS INDEX: 34.57 KG/M2 | SYSTOLIC BLOOD PRESSURE: 122 MMHG | WEIGHT: 278 LBS | DIASTOLIC BLOOD PRESSURE: 72 MMHG

## 2024-04-09 DIAGNOSIS — Z13.6 ENCOUNTER FOR LIPID SCREENING FOR CARDIOVASCULAR DISEASE: ICD-10-CM

## 2024-04-09 DIAGNOSIS — D86.0 SARCOIDOSIS OF LUNG (HCC): ICD-10-CM

## 2024-04-09 DIAGNOSIS — Z13.1 SCREENING FOR DIABETES MELLITUS: ICD-10-CM

## 2024-04-09 DIAGNOSIS — Z00.00 ENCOUNTER FOR WELL ADULT EXAM WITHOUT ABNORMAL FINDINGS: Primary | ICD-10-CM

## 2024-04-09 DIAGNOSIS — J43.9 PULMONARY EMPHYSEMA, UNSPECIFIED EMPHYSEMA TYPE (HCC): ICD-10-CM

## 2024-04-09 DIAGNOSIS — E66.01 CLASS 2 SEVERE OBESITY DUE TO EXCESS CALORIES WITH SERIOUS COMORBIDITY AND BODY MASS INDEX (BMI) OF 37.0 TO 37.9 IN ADULT (HCC): ICD-10-CM

## 2024-04-09 DIAGNOSIS — E29.1 HYPOGONADISM IN MALE: ICD-10-CM

## 2024-04-09 DIAGNOSIS — Z13.220 ENCOUNTER FOR LIPID SCREENING FOR CARDIOVASCULAR DISEASE: ICD-10-CM

## 2024-04-09 DIAGNOSIS — Z12.5 SCREENING FOR PROSTATE CANCER: ICD-10-CM

## 2024-04-09 PROBLEM — H44.513 ABSOLUTE GLAUCOMA, BILATERAL: Status: ACTIVE | Noted: 2024-04-09

## 2024-04-09 PROCEDURE — 99396 PREV VISIT EST AGE 40-64: CPT | Performed by: FAMILY MEDICINE

## 2024-04-09 RX ORDER — BRIMONIDINE TARTRATE 1.5 MG/ML
1 SOLUTION/ DROPS OPHTHALMIC 2 TIMES DAILY
Refills: 3 | COMMUNITY
Start: 2024-04-09

## 2024-04-09 RX ORDER — TESTOSTERONE CYPIONATE 200 MG/ML
INJECTION, SOLUTION INTRAMUSCULAR
Qty: 1 ML | Refills: 0 | COMMUNITY
Start: 2024-04-09

## 2024-04-09 ASSESSMENT — PATIENT HEALTH QUESTIONNAIRE - PHQ9
SUM OF ALL RESPONSES TO PHQ QUESTIONS 1-9: 4
SUM OF ALL RESPONSES TO PHQ QUESTIONS 1-9: 4
5. POOR APPETITE OR OVEREATING: NOT AT ALL
1. LITTLE INTEREST OR PLEASURE IN DOING THINGS: MORE THAN HALF THE DAYS
9. THOUGHTS THAT YOU WOULD BE BETTER OFF DEAD, OR OF HURTING YOURSELF: NOT AT ALL
SUM OF ALL RESPONSES TO PHQ QUESTIONS 1-9: 4
10. IF YOU CHECKED OFF ANY PROBLEMS, HOW DIFFICULT HAVE THESE PROBLEMS MADE IT FOR YOU TO DO YOUR WORK, TAKE CARE OF THINGS AT HOME, OR GET ALONG WITH OTHER PEOPLE: NOT DIFFICULT AT ALL
6. FEELING BAD ABOUT YOURSELF - OR THAT YOU ARE A FAILURE OR HAVE LET YOURSELF OR YOUR FAMILY DOWN: SEVERAL DAYS
SUM OF ALL RESPONSES TO PHQ QUESTIONS 1-9: 4
4. FEELING TIRED OR HAVING LITTLE ENERGY: SEVERAL DAYS
2. FEELING DOWN, DEPRESSED OR HOPELESS: NOT AT ALL
7. TROUBLE CONCENTRATING ON THINGS, SUCH AS READING THE NEWSPAPER OR WATCHING TELEVISION: NOT AT ALL
3. TROUBLE FALLING OR STAYING ASLEEP: NOT AT ALL
8. MOVING OR SPEAKING SO SLOWLY THAT OTHER PEOPLE COULD HAVE NOTICED. OR THE OPPOSITE, BEING SO FIGETY OR RESTLESS THAT YOU HAVE BEEN MOVING AROUND A LOT MORE THAN USUAL: NOT AT ALL
SUM OF ALL RESPONSES TO PHQ9 QUESTIONS 1 & 2: 2
DEPRESSION UNABLE TO ASSESS: FUNCTIONAL CAPACITY MOTIVATION LIMITS ACCURACY

## 2024-04-09 NOTE — PATIENT INSTRUCTIONS
help you develop a safe and effective exercise program.  A counselor or psychiatrist can help you cope with issues such as depression, anxiety, or family problems that can make it hard to focus on weight loss.  Consider joining a support group for people who are trying to lose weight. Your doctor can suggest groups in your area.  Where can you learn more?  Go to https://[email or web address].net/patientEd and enter U357 to learn more about \"Starting a Weight Loss Plan: Care Instructions.\"  Current as of: September 20, 2023               Content Version: 14.0  © 2746-0050 [employer].   Care instructions adapted under license by [employer]. If you have questions about a medical condition or this instruction, always ask your healthcare professional. [employer] disclaims any warranty or liability for your use of this information.

## 2024-04-09 NOTE — ASSESSMENT & PLAN NOTE
Low normal levels and started low dose injections.  Following with CNP at [facility]

## 2024-04-09 NOTE — ASSESSMENT & PLAN NOTE
Monitored by specialist- no acute findings meriting change in the plan.  Seeing Dr. Marilee myers. Breathing stable

## 2024-04-09 NOTE — PROGRESS NOTES
Influenza, FLUARIX, FLULAVAL, FLUZONE (age 6 mo+) AND AFLURIA, (age 3 y+), PF, 0.5mL 10/21/2013, 10/27/2014, 10/11/2016, 09/03/2019, 10/19/2020, 11/16/2021    Influenza, FLUCELVAX, (age 6 mo+), MDCK, PF, 0.5mL 11/15/2022    Pneumococcal, PPSV23, PNEUMOVAX 23, (age 2y+), SC/IM, 0.5mL 03/07/2019    TDaP, ADACEL (age 10y-64y), BOOSTRIX (age 10y+), IM, 0.5mL 11/26/2013, 04/01/2017    Tetanus 04/01/2017        Health Maintenance   Topic Date Due    Hepatitis B vaccine (1 of 3 - 3-dose series) Never done    Pneumococcal 0-64 years Vaccine (2 of 2 - PCV) 03/07/2020    COVID-19 Vaccine (3 - 2023-24 season) 09/01/2023    Shingles vaccine (2 of 2) 01/14/2024    Depression Screen  04/25/2024    A1C test (Diabetic or Prediabetic)  02/17/2025    Colorectal Cancer Screen  03/01/2025    DTaP/Tdap/Td vaccine (3 - Td or Tdap) 04/01/2027    Lipids  02/17/2029    Flu vaccine  Completed    Hepatitis C screen  Completed    HIV screen  Completed    Hepatitis A vaccine  Aged Out    Hib vaccine  Aged Out    Polio vaccine  Aged Out    Meningococcal (ACWY) vaccine  Aged Out     Recommendations for Preventive Services Due: see orders and patient instructions/AVS.    Return in 1 year (on 4/9/2025) for Wellness physical.

## 2024-06-10 ENCOUNTER — PATIENT MESSAGE (OUTPATIENT)
Dept: FAMILY MEDICINE CLINIC | Age: 59
End: 2024-06-10

## 2024-06-10 DIAGNOSIS — E29.1 HYPOGONADISM MALE: Primary | ICD-10-CM

## 2024-06-11 NOTE — TELEPHONE ENCOUNTER
From: Seven FREEMAN Current  To: Dr. Sarina Spears  Sent: 6/10/2024 7:33 PM EDT  Subject: Weight    Allan is not doing great on semaglutide he got some weight off but still at 280. We have been paying this out of pocket and it is expensive she said he could go to zeptide but that is even more expensive. Can you help with a script using his insurance?   He is also taking testosterone from her and that on top of weight stuff is expensive without insurance.   Maybe he should see someone else for that?  Let me know what you can do to help. We have been doing this for over a year and have paid out a ton of cash.

## 2024-06-24 NOTE — TELEPHONE ENCOUNTER
Left vm regarding appt as the patient is scheduled with Dr. Meredith on 6/28. Call back number provided.   Weight management will call patient to schedule.

## 2024-07-22 ENCOUNTER — PATIENT MESSAGE (OUTPATIENT)
Dept: FAMILY MEDICINE CLINIC | Age: 59
End: 2024-07-22

## 2024-07-22 DIAGNOSIS — E66.9 CLASS 1 OBESITY WITH SERIOUS COMORBIDITY AND BODY MASS INDEX (BMI) OF 34.0 TO 34.9 IN ADULT, UNSPECIFIED OBESITY TYPE: Primary | ICD-10-CM

## 2024-07-22 NOTE — TELEPHONE ENCOUNTER
From: Seven FREEMAN Current  Sent: 7/22/2024 4:11 PM EDT  To: [facility] [place]  Subject: Weight    His insurance is [employer] for approval.

## 2024-08-04 ENCOUNTER — PATIENT MESSAGE (OUTPATIENT)
Dept: FAMILY MEDICINE CLINIC | Age: 59
End: 2024-08-04

## 2024-08-04 DIAGNOSIS — E66.9 CLASS 1 OBESITY WITH SERIOUS COMORBIDITY AND BODY MASS INDEX (BMI) OF 34.0 TO 34.9 IN ADULT, UNSPECIFIED OBESITY TYPE: ICD-10-CM

## 2024-08-04 DIAGNOSIS — E66.09 CLASS 1 OBESITY DUE TO EXCESS CALORIES WITH SERIOUS COMORBIDITY AND BODY MASS INDEX (BMI) OF 34.0 TO 34.9 IN ADULT: Primary | ICD-10-CM

## 2024-08-05 NOTE — TELEPHONE ENCOUNTER
From: Seevn FREEMAN Current  To: Dr. Sarina Spears  Sent: 8/4/2024 9:52 AM EDT  Subject: Mounjaro    [facility] reached out and said they will only process a 90 day scrip. So maybe you send the [facility] in [place]. In case you need to increase his dose?   Sounds like will be approved.

## 2024-08-12 PROBLEM — R79.89 LOW TESTOSTERONE IN MALE: Status: ACTIVE | Noted: 2024-04-09

## 2024-08-12 NOTE — TELEPHONE ENCOUNTER
Rx sent for 1 month supply (incase dose needs to be changed) to [facility]

## 2024-08-25 ENCOUNTER — PATIENT MESSAGE (OUTPATIENT)
Dept: FAMILY MEDICINE CLINIC | Age: 59
End: 2024-08-25

## 2024-08-25 DIAGNOSIS — E66.09 CLASS 1 OBESITY DUE TO EXCESS CALORIES WITH SERIOUS COMORBIDITY AND BODY MASS INDEX (BMI) OF 34.0 TO 34.9 IN ADULT: Primary | ICD-10-CM

## 2024-08-26 RX ORDER — TIRZEPATIDE 10 MG/.5ML
10 INJECTION, SOLUTION SUBCUTANEOUS WEEKLY
Qty: 2 ML | Refills: 3 | Status: SHIPPED | OUTPATIENT
Start: 2024-08-26 | End: 2024-08-27 | Stop reason: SDUPTHER

## 2024-08-27 RX ORDER — TIRZEPATIDE 10 MG/.5ML
10 INJECTION, SOLUTION SUBCUTANEOUS WEEKLY
Qty: 6 ML | Refills: 3 | Status: SHIPPED | OUTPATIENT
Start: 2024-08-27

## 2024-11-05 ENCOUNTER — PATIENT MESSAGE (OUTPATIENT)
Dept: FAMILY MEDICINE CLINIC | Age: 59
End: 2024-11-05

## 2024-11-05 DIAGNOSIS — F42.9 OCD (OBSESSIVE COMPULSIVE DISORDER): ICD-10-CM

## 2024-11-05 DIAGNOSIS — F41.9 ANXIETY: ICD-10-CM

## 2024-11-06 RX ORDER — FLUVOXAMINE MALEATE 50 MG
TABLET ORAL
Qty: 90 TABLET | Refills: 3 | Status: SHIPPED | OUTPATIENT
Start: 2024-11-06

## 2025-04-15 ENCOUNTER — OFFICE VISIT (OUTPATIENT)
Dept: FAMILY MEDICINE CLINIC | Age: 60
End: 2025-04-15
Payer: COMMERCIAL

## 2025-04-15 VITALS
BODY MASS INDEX: 35.06 KG/M2 | SYSTOLIC BLOOD PRESSURE: 122 MMHG | HEART RATE: 78 BPM | HEIGHT: 75 IN | WEIGHT: 282 LBS | OXYGEN SATURATION: 95 % | DIASTOLIC BLOOD PRESSURE: 80 MMHG

## 2025-04-15 DIAGNOSIS — M79.89 MASS OF SOFT TISSUE OF LEFT UPPER EXTREMITY: ICD-10-CM

## 2025-04-15 DIAGNOSIS — Z00.00 ENCOUNTER FOR WELL ADULT EXAM WITHOUT ABNORMAL FINDINGS: Primary | ICD-10-CM

## 2025-04-15 DIAGNOSIS — E66.09 CLASS 1 OBESITY DUE TO EXCESS CALORIES WITH SERIOUS COMORBIDITY AND BODY MASS INDEX (BMI) OF 34.0 TO 34.9 IN ADULT: ICD-10-CM

## 2025-04-15 DIAGNOSIS — E66.811 CLASS 1 OBESITY DUE TO EXCESS CALORIES WITH SERIOUS COMORBIDITY AND BODY MASS INDEX (BMI) OF 34.0 TO 34.9 IN ADULT: ICD-10-CM

## 2025-04-15 DIAGNOSIS — R09.81 SINUS CONGESTION: ICD-10-CM

## 2025-04-15 DIAGNOSIS — Z12.11 SCREEN FOR COLON CANCER: ICD-10-CM

## 2025-04-15 PROCEDURE — 99396 PREV VISIT EST AGE 40-64: CPT | Performed by: FAMILY MEDICINE

## 2025-04-15 PROCEDURE — 99214 OFFICE O/P EST MOD 30 MIN: CPT | Performed by: FAMILY MEDICINE

## 2025-04-15 RX ORDER — MONTELUKAST SODIUM 10 MG/1
10 TABLET ORAL DAILY
Qty: 90 TABLET | Refills: 3 | Status: SHIPPED | OUTPATIENT
Start: 2025-04-15

## 2025-04-15 RX ORDER — MULTIVITAMIN WITH IRON
1 TABLET ORAL DAILY
COMMUNITY

## 2025-04-15 RX ORDER — ASPIRIN 81 MG/1
81 TABLET ORAL DAILY
COMMUNITY

## 2025-04-15 RX ORDER — CEPHRADINE 500 MG
1000 CAPSULE ORAL DAILY
COMMUNITY

## 2025-04-15 SDOH — ECONOMIC STABILITY: FOOD INSECURITY: WITHIN THE PAST 12 MONTHS, THE FOOD YOU BOUGHT JUST DIDN'T LAST AND YOU DIDN'T HAVE MONEY TO GET MORE.: NEVER TRUE

## 2025-04-15 SDOH — ECONOMIC STABILITY: FOOD INSECURITY: WITHIN THE PAST 12 MONTHS, YOU WORRIED THAT YOUR FOOD WOULD RUN OUT BEFORE YOU GOT MONEY TO BUY MORE.: NEVER TRUE

## 2025-04-15 ASSESSMENT — PATIENT HEALTH QUESTIONNAIRE - PHQ9
1. LITTLE INTEREST OR PLEASURE IN DOING THINGS: SEVERAL DAYS
2. FEELING DOWN, DEPRESSED OR HOPELESS: NOT AT ALL
SUM OF ALL RESPONSES TO PHQ QUESTIONS 1-9: 1

## 2025-04-15 NOTE — ASSESSMENT & PLAN NOTE
Patient wants to continue on GLP-1 and will work on a more formal exercise program after finishing kitchen remodeling  Patient will notify which cardiology group to get referral for risk stratification for cardiac disease and stress testing.

## 2025-04-15 NOTE — PROGRESS NOTES
Well Adult Note  Name: Seven FREEMAN Current Today’s Date: 4/15/2025   MRN: [id number] Sex: Male   Age: 59 y.o. Ethnicity: Non- / Non    : 1965 Race: White (non-)      Seven FREEMAN Current is here for a well adult exam.       Assessment & Plan   Assessment & Plan  Encounter for well adult exam without abnormal findings   Referral to crc screening.        Sinus congestion   Add singualair to flonase, claritin and mucinex for keep sinus congestion in control during this spring season   Orders:    ESTABLISHED, LOW MDM, 20-29 MIN [20050]    montelukast (SINGULAIR) 10 MG tablet; Take 1 tablet by mouth daily    Class 1 obesity due to excess calories with serious comorbidity and body mass index (BMI) of 34.0 to 34.9 in adult    Patient wants to continue on GLP-1 and will work on a more formal exercise program after finishing kitchen remodeling   Patient will notify which cardiology group to get referral for risk stratification for cardiac disease and stress testing.          Mass of soft tissue of left upper extremity    Will follow clinically and if changes will refer for surgical removal         Screen for colon cancer    Referral back to Dr Shrestha                       Return in 1 year (on 4/15/2026) for CPE (Physical Exam).       Subjective   History:  Chief Complaint   Patient presents with    Annual Exam     Little congestion and ear ache-pt states it's happened twice in two months  Ears popping   Restarted flonase, mucinex and claritin.  Following with [facility] Dr. Giraldo for glaucoma- right worse than left and no current eye drops at this time.   Mild nausea with semaglutide and staying active with kitchen remodeling and work.   Patient denies any exertional chest pain, dyspnea, palpitations, syncope, orthopnea, edema or paroxysmal nocturnal dyspnea. But hasn't had a stress test in nearly 20 years.  Working on weight loss with GLP and staying active with kitchen remodeling and still working at his

## 2025-04-22 ENCOUNTER — PATIENT MESSAGE (OUTPATIENT)
Dept: FAMILY MEDICINE CLINIC | Age: 60
End: 2025-04-22

## 2025-04-22 DIAGNOSIS — E78.6 LOW HDL (UNDER 40): ICD-10-CM

## 2025-04-22 DIAGNOSIS — R53.83 OTHER FATIGUE: Primary | ICD-10-CM

## 2025-05-03 LAB
A/G RATIO: 1.8 RATIO (ref 0.8–2.6)
ALBUMIN: 4.3 G/DL (ref 3.5–5.2)
ALP BLD-CCNC: 86 U/L (ref 23–144)
ALT SERPL-CCNC: 16 U/L (ref 0–60)
AST SERPL-CCNC: 23 U/L (ref 0–55)
BILIRUB SERPL-MCNC: 0.6 MG/DL (ref 0–1.2)
BUN / CREAT RATIO: 12 (ref 7–25)
BUN BLDV-MCNC: 13 MG/DL (ref 3–29)
CALCIUM SERPL-MCNC: 9.7 MG/DL (ref 8.5–10.5)
CHLORIDE BLD-SCNC: 105 MEQ/L (ref 96–110)
CHOLESTEROL, TOTAL: 159 MG/DL
CO2: 24 MEQ/L (ref 19–32)
CREAT SERPL-MCNC: 1.1 MG/DL (ref 0.5–1.2)
ESTIMATED GLOMERULAR FILTRATION RATE CREATININE EQUATION: 78 MLS/MIN/1.73M2
FASTING STATUS: NORMAL
GLOBULIN: 2.4 G/DL (ref 1.9–3.6)
GLUCOSE BLD-MCNC: 99 MG/DL (ref 70–99)
HCT VFR BLD CALC: 50.5 % (ref 37.5–51)
HDLC SERPL-MCNC: 35 MG/DL
HEMOGLOBIN: 17.1 G/DL (ref 13–17.7)
LDL CHOLESTEROL: 109 MG/DL
MCH RBC QN AUTO: 29.6 PG (ref 26–34)
MCHC RBC AUTO-ENTMCNC: 33.9 G/DL (ref 30.7–35.5)
MCV RBC AUTO: 87.5 FL (ref 80–100)
PDW BLD-RTO: 12.9 %
PLATELET # BLD: 169 K/UL (ref 140–400)
PMV BLD AUTO: 11.5 FL (ref 7.2–11.7)
POTASSIUM SERPL-SCNC: 4 MEQ/L (ref 3.4–5.3)
RBC # BLD: 5.77 M/UL (ref 4.14–5.8)
SODIUM BLD-SCNC: 141 MEQ/L (ref 135–148)
TOTAL PROTEIN: 6.7 G/DL (ref 6–8.3)
TRIGL SERPL-MCNC: 73 MG/DL
VITAMIN D 25-HYDROXY: 111 NG/ML (ref 30–100)
VLDLC SERPL CALC-MCNC: 15 MG/DL (ref 4–38)
WBC # BLD: 7.5 K/UL (ref 3.5–10.9)

## 2025-05-16 RX ORDER — FOLIC ACID 1 MG/1
1000 TABLET ORAL DAILY
Qty: 100 TABLET | Refills: 3 | Status: SHIPPED | OUTPATIENT
Start: 2025-05-16

## 2025-05-28 ENCOUNTER — PATIENT MESSAGE (OUTPATIENT)
Dept: FAMILY MEDICINE CLINIC | Age: 60
End: 2025-05-28